# Patient Record
Sex: MALE | Race: WHITE | NOT HISPANIC OR LATINO | URBAN - METROPOLITAN AREA
[De-identification: names, ages, dates, MRNs, and addresses within clinical notes are randomized per-mention and may not be internally consistent; named-entity substitution may affect disease eponyms.]

---

## 2023-02-06 ENCOUNTER — APPOINTMENT (OUTPATIENT)
Dept: CT IMAGING | Facility: HOSPITAL | Age: 83
End: 2023-02-06
Payer: MEDICARE

## 2023-02-06 ENCOUNTER — HOSPITAL ENCOUNTER (EMERGENCY)
Facility: HOSPITAL | Age: 83
Discharge: HOME | End: 2023-02-06
Attending: EMERGENCY MEDICINE | Admitting: EMERGENCY MEDICINE
Payer: MEDICARE

## 2023-02-06 VITALS
SYSTOLIC BLOOD PRESSURE: 160 MMHG | HEART RATE: 63 BPM | OXYGEN SATURATION: 97 % | TEMPERATURE: 97.7 F | WEIGHT: 243.39 LBS | HEIGHT: 71 IN | DIASTOLIC BLOOD PRESSURE: 76 MMHG | RESPIRATION RATE: 18 BRPM | BODY MASS INDEX: 34.07 KG/M2

## 2023-02-06 DIAGNOSIS — N23 RENAL COLIC: Primary | ICD-10-CM

## 2023-02-06 DIAGNOSIS — N20.1 URETEROLITHIASIS: ICD-10-CM

## 2023-02-06 LAB
ALBUMIN SERPL-MCNC: 3.7 G/DL (ref 3.5–5.2)
ALBUMIN/GLOB SERPL: 1.3 G/DL
ALP SERPL-CCNC: 87 U/L (ref 39–117)
ALT SERPL W P-5'-P-CCNC: 19 U/L (ref 1–41)
ANION GAP SERPL CALCULATED.3IONS-SCNC: 12 MMOL/L (ref 5–15)
AST SERPL-CCNC: 23 U/L (ref 1–40)
BACTERIA UR QL AUTO: ABNORMAL /HPF
BASOPHILS # BLD AUTO: 0.03 10*3/MM3 (ref 0–0.2)
BASOPHILS NFR BLD AUTO: 0.4 % (ref 0–1.5)
BILIRUB SERPL-MCNC: 0.6 MG/DL (ref 0–1.2)
BILIRUB UR QL STRIP: NEGATIVE
BUN SERPL-MCNC: 16 MG/DL (ref 8–23)
BUN/CREAT SERPL: 15.7 (ref 7–25)
CALCIUM SPEC-SCNC: 8.9 MG/DL (ref 8.6–10.5)
CHLORIDE SERPL-SCNC: 103 MMOL/L (ref 98–107)
CLARITY UR: CLEAR
CO2 SERPL-SCNC: 22 MMOL/L (ref 22–29)
COLOR UR: YELLOW
CREAT SERPL-MCNC: 1.02 MG/DL (ref 0.76–1.27)
D-LACTATE SERPL-SCNC: 1.1 MMOL/L (ref 0.5–2)
D-LACTATE SERPL-SCNC: 3.8 MMOL/L (ref 0.5–2)
DEPRECATED RDW RBC AUTO: 46.4 FL (ref 37–54)
EGFRCR SERPLBLD CKD-EPI 2021: 73.4 ML/MIN/1.73
EOSINOPHIL # BLD AUTO: 0.26 10*3/MM3 (ref 0–0.4)
EOSINOPHIL NFR BLD AUTO: 3.5 % (ref 0.3–6.2)
ERYTHROCYTE [DISTWIDTH] IN BLOOD BY AUTOMATED COUNT: 13.1 % (ref 12.3–15.4)
GLOBULIN UR ELPH-MCNC: 2.9 GM/DL
GLUCOSE SERPL-MCNC: 208 MG/DL (ref 65–99)
GLUCOSE UR STRIP-MCNC: ABNORMAL MG/DL
HCT VFR BLD AUTO: 38.9 % (ref 37.5–51)
HGB BLD-MCNC: 13.1 G/DL (ref 13–17.7)
HGB UR QL STRIP.AUTO: NEGATIVE
HOLD SPECIMEN: NORMAL
HOLD SPECIMEN: NORMAL
HYALINE CASTS UR QL AUTO: ABNORMAL /LPF
IMM GRANULOCYTES # BLD AUTO: 0.03 10*3/MM3 (ref 0–0.05)
IMM GRANULOCYTES NFR BLD AUTO: 0.4 % (ref 0–0.5)
KETONES UR QL STRIP: NEGATIVE
LEUKOCYTE ESTERASE UR QL STRIP.AUTO: ABNORMAL
LIPASE SERPL-CCNC: 23 U/L (ref 13–60)
LYMPHOCYTES # BLD AUTO: 2 10*3/MM3 (ref 0.7–3.1)
LYMPHOCYTES NFR BLD AUTO: 26.8 % (ref 19.6–45.3)
MCH RBC QN AUTO: 32.3 PG (ref 26.6–33)
MCHC RBC AUTO-ENTMCNC: 33.7 G/DL (ref 31.5–35.7)
MCV RBC AUTO: 96 FL (ref 79–97)
MONOCYTES # BLD AUTO: 0.58 10*3/MM3 (ref 0.1–0.9)
MONOCYTES NFR BLD AUTO: 7.8 % (ref 5–12)
NEUTROPHILS NFR BLD AUTO: 4.55 10*3/MM3 (ref 1.7–7)
NEUTROPHILS NFR BLD AUTO: 61.1 % (ref 42.7–76)
NITRITE UR QL STRIP: NEGATIVE
NRBC BLD AUTO-RTO: 0 /100 WBC (ref 0–0.2)
PH UR STRIP.AUTO: <=5 [PH] (ref 5–8)
PLATELET # BLD AUTO: 190 10*3/MM3 (ref 140–450)
PMV BLD AUTO: 10.8 FL (ref 6–12)
POTASSIUM SERPL-SCNC: 4 MMOL/L (ref 3.5–5.2)
PROT SERPL-MCNC: 6.6 G/DL (ref 6–8.5)
PROT UR QL STRIP: NEGATIVE
RBC # BLD AUTO: 4.05 10*6/MM3 (ref 4.14–5.8)
RBC # UR STRIP: ABNORMAL /HPF
REF LAB TEST METHOD: ABNORMAL
SODIUM SERPL-SCNC: 137 MMOL/L (ref 136–145)
SP GR UR STRIP: 1.02 (ref 1–1.03)
SQUAMOUS #/AREA URNS HPF: ABNORMAL /HPF
UROBILINOGEN UR QL STRIP: ABNORMAL
WBC # UR STRIP: ABNORMAL /HPF
WBC NRBC COR # BLD: 7.45 10*3/MM3 (ref 3.4–10.8)
WHOLE BLOOD HOLD COAG: NORMAL
WHOLE BLOOD HOLD SPECIMEN: NORMAL

## 2023-02-06 PROCEDURE — 83605 ASSAY OF LACTIC ACID: CPT

## 2023-02-06 PROCEDURE — 74176 CT ABD & PELVIS W/O CONTRAST: CPT

## 2023-02-06 PROCEDURE — 83690 ASSAY OF LIPASE: CPT

## 2023-02-06 PROCEDURE — 85025 COMPLETE CBC W/AUTO DIFF WBC: CPT

## 2023-02-06 PROCEDURE — 36415 COLL VENOUS BLD VENIPUNCTURE: CPT

## 2023-02-06 PROCEDURE — 80053 COMPREHEN METABOLIC PANEL: CPT

## 2023-02-06 PROCEDURE — 81001 URINALYSIS AUTO W/SCOPE: CPT

## 2023-02-06 PROCEDURE — 87086 URINE CULTURE/COLONY COUNT: CPT

## 2023-02-06 PROCEDURE — 99283 EMERGENCY DEPT VISIT LOW MDM: CPT

## 2023-02-06 PROCEDURE — 83605 ASSAY OF LACTIC ACID: CPT | Performed by: EMERGENCY MEDICINE

## 2023-02-06 PROCEDURE — 87077 CULTURE AEROBIC IDENTIFY: CPT

## 2023-02-06 PROCEDURE — 87186 SC STD MICRODIL/AGAR DIL: CPT

## 2023-02-06 RX ORDER — SODIUM CHLORIDE 0.9 % (FLUSH) 0.9 %
10 SYRINGE (ML) INJECTION AS NEEDED
Status: DISCONTINUED | OUTPATIENT
Start: 2023-02-06 | End: 2023-02-06 | Stop reason: HOSPADM

## 2023-02-06 RX ADMIN — SODIUM CHLORIDE, POTASSIUM CHLORIDE, SODIUM LACTATE AND CALCIUM CHLORIDE 1000 ML: 600; 310; 30; 20 INJECTION, SOLUTION INTRAVENOUS at 17:56

## 2023-02-06 NOTE — ED PROVIDER NOTES
Time: 5:51 PM EST  Date of encounter:  2/6/2023  Independent Historian/Clinical History and Information was obtained by:   Patient and Family     Chief Complaint: Right-sided abdominal pain    History is limited by: N/A    History of Present Illness:  Patient is a 82 y.o. year old male who presents to the emergency department for evaluation of right-sided abdominal pain.  Patient complains of sharp pain that began last night.  Patient is a pain kept him up most of the night.  He states that the pain radiated from his right flank to his groin.  Patient reports his pain has markedly improved over the last hour or so.  Patient midst to some nausea but denies any vomiting.  He denies any fevers or chills.  Patient denies ever having pain like this before.    HPI    Patient Care Team  Primary Care Provider: Provider, Kalyn Known    Past Medical History:     Allergies   Allergen Reactions   • Penicillins Unknown - Low Severity     Past Medical History:   Diagnosis Date   • Hyperlipidemia      Past Surgical History:   Procedure Laterality Date   • APPENDECTOMY     • COLON SURGERY     • EYE SURGERY       History reviewed. No pertinent family history.    Home Medications:  Prior to Admission medications    Medication Sig Start Date End Date Taking? Authorizing Provider   dutasteride (AVODART) 0.5 MG capsule Take 0.5 mg by mouth Daily.    ProviderSalud MD   simvastatin (ZOCOR) 10 MG tablet Take 10 mg by mouth.    ProviderSalud MD   tamsulosin (FLOMAX) 0.4 MG capsule 24 hr capsule Take 0.4 mg by mouth Daily.    Provider, MD Salud        Social History:   Social History     Tobacco Use   • Smoking status: Never   • Smokeless tobacco: Never   Vaping Use   • Vaping Use: Never used   Substance Use Topics   • Alcohol use: Never   • Drug use: Never         Review of Systems:  Review of Systems   Constitutional: Negative for chills and fever.   HENT: Negative for congestion, ear pain and sore throat.    Eyes:  "Negative for pain.   Respiratory: Negative for cough, chest tightness and shortness of breath.    Cardiovascular: Negative for chest pain.   Gastrointestinal: Positive for abdominal pain and nausea. Negative for diarrhea and vomiting.   Genitourinary: Positive for flank pain. Negative for hematuria.   Musculoskeletal: Negative for joint swelling.   Skin: Negative for pallor.   Neurological: Negative for seizures and headaches.   All other systems reviewed and are negative.       Physical Exam:  /76   Pulse 63   Temp 97.7 °F (36.5 °C)   Resp 18   Ht 180.3 cm (71\")   Wt 110 kg (243 lb 6.2 oz)   SpO2 97%   BMI 33.95 kg/m²     Physical Exam       Vital signs were reviewed under triage note.  General appearance - Patient appears well-developed and well-nourished.  Patient is in no acute distress.  Head - Normocephalic, atraumatic.  Pupils - Equal, round, reactive to light.  Extraocular muscles are intact.  Conjunctive is clear.  Nasal - Normal inspection.  No evidence of trauma or epistaxis.  Tympanic membranes - Gray, intact without erythema or retractions.  Oral mucosa - Pink and moist without lesions or erythema.  Uvula is midline.  Chest wall - Atraumatic.  Chest wall is nontender.  There is no vesicular rashes noted.  Neck - Supple.  Trachea was midline.  There is no palpable lymphadenopathy or thyromegaly.  There are no meningeal signs  Lungs - Clear to auscultation and percussion bilaterally.  Heart - Regular rate and rhythm without any murmurs, clicks, or gallops.  Abdomen - Soft.  Bowel sounds are present.  There is no palpable tenderness.  There is no rebound, guarding, or rigidity.  There are no palpable masses.  There are no pulsatile masses.  Back - Spine is straight and midline.  There is no CVA tenderness.  Extremities - Intact x4 with full range of motion.  There is no palpable edema.  Pulses are intact x4 and equal.  Neurologic - Patient is awake, alert, and oriented x3.  Cranial nerves II " through XII are grossly intact.  Motor and sensory functions grossly intact.  Cerebellar function was normal.  Integument - There are no rashes.  There are no petechia or purpura lesions noted.  There are no vesicular lesions noted.      Procedures:  Procedures      Medical Decision Making:      Comorbidities that affect care:    Hyperlipidemia, BPH    External Notes reviewed:    None      The following orders were placed and all results were independently analyzed by me:  Orders Placed This Encounter   Procedures   • CT Abdomen Pelvis Stone Protocol   • [place] Draw   • Comprehensive Metabolic Panel   • Lipase   • Urinalysis With Microscopic If Indicated (No Culture) - Urine, Clean Catch   • Lactic Acid, Plasma   • CBC Auto Differential   • STAT Lactic Acid, Reflex   • Urinalysis, Microscopic Only - Urine, Clean Catch   • Undress & Gown   • CBC & Differential   • Green Top (Gel)   • Lavender Top   • Gold Top - SST   • Light Blue Top       Medications Given in the Emergency Department:  Medications   lactated ringers bolus 1,000 mL (0 mL Intravenous Stopped 2/6/23 5574)        ED Course:     The patient was seen and evaluated in the ED by me.  The above history and physical examination was performed as documented.  Diagnostic data was obtained.  Results reviewed.  Discussed with the patient.  The patient's pain has resolved.  Patient CT work-up demonstrates the patient is actually passed the kidney stone into the bladder.  This would account for his resolution of pain.  At this point time patient be discharged home.  There is no signs of infection.    Labs:    Lab Results (last 24 hours)     Procedure Component Value Units Date/Time    POC Urinalysis Dipstick, Multipro (Automated Dipstick) [[id number]]  (Abnormal) Resulted: 02/06/23 1244    Specimen: Urine Updated: 02/06/23 1245     Color Elsy     Clarity, UA Clear     Glucose, UA Negative mg/dL      Bilirubin Negative     Ketones, UA Negative     Specific Gravity   1.030     Blood, UA Negative     pH, Urine 5.0     Protein, POC Negative mg/dL      Urobilinogen, UA 0.2 E.U./dL     Nitrite, UA --     Leukocytes Small (1+)    Urine Culture - Urine, Urine, Clean Catch [[id number]]  (Normal) Collected: 02/06/23 1307    Specimen: Urine, Clean Catch Updated: 02/07/23 1050     Urine Culture Growth present, too young to evaluate    CBC & Differential [[id number]]  (Abnormal) Collected: 02/06/23 1512    Specimen: Blood Updated: 02/06/23 1525    Narrative:      The following orders were created for panel order CBC & Differential.  Procedure                               Abnormality         Status                     ---------                               -----------         ------                     CBC Auto Differential[[id number]]        Abnormal            Final result                 Please view results for these tests on the individual orders.    Comprehensive Metabolic Panel [[id number]]  (Abnormal) Collected: 02/06/23 1512    Specimen: Blood Updated: 02/06/23 1544     Glucose 208 mg/dL      BUN 16 mg/dL      Creatinine 1.02 mg/dL      Sodium 137 mmol/L      Potassium 4.0 mmol/L      Chloride 103 mmol/L      CO2 22.0 mmol/L      Calcium 8.9 mg/dL      Total Protein 6.6 g/dL      Albumin 3.7 g/dL      ALT (SGPT) 19 U/L      AST (SGOT) 23 U/L      Alkaline Phosphatase 87 U/L      Total Bilirubin 0.6 mg/dL      Globulin 2.9 gm/dL      A/G Ratio 1.3 g/dL      BUN/Creatinine Ratio 15.7     Anion Gap 12.0 mmol/L      eGFR 73.4 mL/min/1.73     Narrative:      GFR Normal >60  Chronic Kidney Disease <60  Kidney Failure <15    The GFR formula is only valid for adults with stable renal function between ages 18 and 70.    Lipase [[id number]]  (Normal) Collected: 02/06/23 1512    Specimen: Blood Updated: 02/06/23 1544     Lipase 23 U/L     Lactic Acid, Plasma [[id number]]  (Abnormal) Collected: 02/06/23 1512    Specimen: Blood Updated: 02/06/23 1555     Lactate 3.8 mmol/L     CBC Auto Differential  [[id number]]  (Abnormal) Collected: 02/06/23 1512    Specimen: Blood Updated: 02/06/23 1525     WBC 7.45 10*3/mm3      RBC 4.05 10*6/mm3      Hemoglobin 13.1 g/dL      Hematocrit 38.9 %      MCV 96.0 fL      MCH 32.3 pg      MCHC 33.7 g/dL      RDW 13.1 %      RDW-SD 46.4 fl      MPV 10.8 fL      Platelets 190 10*3/mm3      Neutrophil % 61.1 %      Lymphocyte % 26.8 %      Monocyte % 7.8 %      Eosinophil % 3.5 %      Basophil % 0.4 %      Immature Grans % 0.4 %      Neutrophils, Absolute 4.55 10*3/mm3      Lymphocytes, Absolute 2.00 10*3/mm3      Monocytes, Absolute 0.58 10*3/mm3      Eosinophils, Absolute 0.26 10*3/mm3      Basophils, Absolute 0.03 10*3/mm3      Immature Grans, Absolute 0.03 10*3/mm3      nRBC 0.0 /100 WBC     Urinalysis With Microscopic If Indicated (No Culture) - Urine, Clean Catch [[id number]]  (Abnormal) Collected: 02/06/23 1611    Specimen: Urine, Clean Catch Updated: 02/06/23 1631     Color, UA Yellow     Appearance, UA Clear     pH, UA <=5.0     Specific Gravity, UA 1.024     Glucose,  mg/dL (1+)     Ketones, UA Negative     Bilirubin, UA Negative     Blood, UA Negative     Protein, UA Negative     Leuk Esterase, UA Small (1+)     Nitrite, UA Negative     Urobilinogen, UA 1.0 E.U./dL    Urinalysis, Microscopic Only - Urine, Clean Catch [[id number]]  (Abnormal) Collected: 02/06/23 1611    Specimen: Urine, Clean Catch Updated: 02/06/23 1631     RBC, UA 0-2 /HPF      WBC, UA 3-5 /HPF      Bacteria, UA None Seen /HPF      Squamous Epithelial Cells, UA 0-2 /HPF      Hyaline Casts, UA 3-6 /LPF      Methodology Automated Microscopy    STAT Lactic Acid, Reflex [[id number]]  (Normal) Collected: 02/06/23 1822    Specimen: Blood Updated: 02/06/23 1831     Lactate 1.1 mmol/L            Imaging:    CT Abdomen Pelvis Stone Protocol    Result Date: 2/6/2023  PROCEDURE: CT ABDOMEN PELVIS STONE PROTOCOL  COMPARISON: None  INDICATIONS: abd pain, UC sent for stone r/o  TECHNIQUE: CT images were created  without intravenous contrast.   PROTOCOL:   Standard imaging protocol performed    RADIATION:   DLP: 831.2 mGy*cm   Automated exposure control was utilized to minimize radiation dose.  FINDINGS:  Limited views of the lung bases demonstrate mild linear opacities suggestive of atelectasis.  The liver is unremarkable.  The gallbladder is normal.  The pancreas is normal.  Spleen is unremarkable.  Bilateral adrenal glands are normal.  There is a stone 5 mm stone adjacent to the right UVJ.  There is no right-sided hydronephrosis.  This may reflect a recently passed stone.  There is thinning of the renal cortices bilaterally.  There is an exophytic right renal cyst.  Prostatic calcifications are identified.  There is diverticulosis without evidence of diverticulitis.  The appendix is not well visualized.  The small bowel is unremarkable.  There is a small fat containing umbilical hernia.  There is diastasis of the rectus abdominus muscle.  No aggressive appearing lytic or sclerotic bone lesions are identified.        1. 5 mm stone layering dependently within the bladder, adjacent to the right UVJ, may represent a recently passed stone.  No evidence of hydronephrosis or obstructive uropathy is identified.     MARIO MARTINEZ MD       Electronically Signed and Approved By: MARIO MARTINEZ MD on 2/06/2023 at 15:51             XR Abdomen KUB    Result Date: 2/6/2023  PROCEDURE: XR ABDOMEN KUB  COMPARISON: None  INDICATIONS: LOWER ABDOMINAL PAIN  FINDINGS:  There is a calcification in the right upper quadrant measuring 0.62 cm.  This could be renal in nature.  This might be in the area of the renal pelvis.  It looks like there may be left renal calculus measuring 1.1 cm as well as tiny calcifications in the area of the left kidney.  Assessment is somewhat limited by overlying bowel.  There are pelvic calcifications which could reflect phleboliths.  There are degenerative changes involving lumbar spine more marked in the lower  lumbar area.  Bowel gas pattern is nonspecific.         1. Bilateral renal calculi are suggested.  CT may be of benefit to reassess.     AUDREY GUILLORY MD       Electronically Signed and Approved By: AUDREY GUILLORY MD on 2/06/2023 at 12:58                 Differential Diagnosis and Discussion:    Abdominal Pain: Based on the patient's signs and symptoms, I considered abdominal aortic aneurysm, small bowel obstruction, pancreatitis, acute cholecystitis, acute appendecitis, peptic ulcer disease, gastritis, colitis, endocrine disorders, irritable bowel syndrome and other differential diagnosis an etiology of the patient's abdominal pain.    All labs were reviewed and analyzed by me.  CT scan radiology interpretation was reviewed by me.    MDM         Patient Care Considerations:          Consultants/Shared Management Plan:    None    Social Determinants of Health:    Patient has presented with family members who are responsible, reliable and will ensure follow up care.      Disposition and Care Coordination:    Discharged: The patient is suitable and stable for discharge with no need for consideration of observation or admission.    I have explained the patient´s condition, diagnoses and treatment plan based on the information available to me at this time. I have answered questions and addressed any concerns. The patient has a good  understanding of the patient´s diagnosis, condition, and treatment plan as can be expected at this point. The vital signs have been stable. The patient´s condition is stable and appropriate for discharge from the emergency department.      The patient will pursue further outpatient evaluation with the primary care physician or other designated or consulting physician as outlined in the discharge instructions. They are agreeable to this plan of care and follow-up instructions have been explained in detail. The patient has received these instructions in written format and have expressed an  understanding of the discharge instructions. The patient is aware that any significant change in condition or worsening of symptoms should prompt an immediate return to this or the closest emergency department or call to 911.  I have explained discharge medications and the need for follow up with the patient/caretakers. This was also printed in the discharge instructions. Patient was discharged with the following medications and follow up:      Medication List      No changes were made to your prescriptions during this visit.        With your family doctor as needed.           Final diagnoses:   Renal colic   Ureterolithiasis        ED Disposition     ED Disposition   Discharge    Condition   Stable    Comment   --             This medical record created using voice recognition software.             Gio Elaine DO  02/07/23 1159

## 2023-02-06 NOTE — ED PROVIDER NOTES
Time: 3:01 PM EST  Date of encounter:  2/6/2023  Independent Historian/Clinical History and Information was obtained by:   Patient  Chief Complaint   Patient presents with   • Abdominal Pain       History is limited by: N/A    History of Present Illness:  Patient is a 82 y.o. year old male who presents to the emergency department for evaluation of RLQ abdominal pain. Starting yesterday. Seen by  and concerned for kidney stone. KUB showed b/l renal calculi and US showed small leuks. Denies known history of stones. Denies known hematuria, dysuria, nausea, vomiting, diarrhea. Denies flank pain. (Abbie Salinas PA-C provider in triage 3:02 PM EST )     [facility]    Patient Care Team  Primary Care Provider: Kalyn Hathaway Known    Past Medical History:     Allergies   Allergen Reactions   • Penicillins Unknown - Low Severity     Past Medical History:   Diagnosis Date   • Hyperlipidemia      Past Surgical History:   Procedure Laterality Date   • APPENDECTOMY     • COLON SURGERY     • EYE SURGERY       No family history on file.    Home Medications:  Prior to Admission medications    Medication Sig Start Date End Date Taking? Authorizing Provider   dutasteride (AVODART) 0.5 MG capsule Take 0.5 mg by mouth Daily.    ProviderSalud MD   simvastatin (ZOCOR) 10 MG tablet Take 10 mg by mouth.    ProviderSalud MD   tamsulosin (FLOMAX) 0.4 MG capsule 24 hr capsule Take 0.4 mg by mouth Daily.    ProviderSalud MD        Social History:   Social History     Tobacco Use   • Smoking status: Never   • Smokeless tobacco: Never   Vaping Use   • Vaping Use: Never used   Substance Use Topics   • Alcohol use: Never   • Drug use: Never         Review of Systems:  Review of Systems   Gastrointestinal: Positive for abdominal pain. Negative for diarrhea, nausea and vomiting.   Genitourinary: Negative for dysuria.        Physical Exam:  /63 (BP Location: Left arm)   Pulse 66   Temp 97.7 °F (36.5 °C)   Resp 18   Ht  "180.3 cm (71\")   Wt 110 kg (243 lb 6.2 oz)   SpO2 95%   BMI 33.95 kg/m²     Physical Exam  Constitutional:       Appearance: Normal appearance.   HENT:      Head: Normocephalic.   Eyes:      Extraocular Movements: Extraocular movements intact.      Conjunctiva/sclera: Conjunctivae normal.   Pulmonary:      Effort: Pulmonary effort is normal.   Abdominal:      General: There is no distension.      Tenderness: There is abdominal tenderness.   Skin:     General: Skin is warm.      Coloration: Skin is not cyanotic.   Neurological:      Mental Status: He is alert and oriented to person, place, and time.   Psychiatric:         Attention and Perception: Attention and perception normal.         Mood and Affect: Mood normal.                  Procedures:  Procedures      Medical Decision Making:      Comorbidities that affect care:    {Comorbidities that affect care:24142}    External Notes reviewed:    {External Note review (Optional):06087}      The following orders were placed and all results were independently analyzed by me:  No orders of the defined types were placed in this encounter.      Medications Given in the Emergency Department:  Medications - No data to display     ED Course:    The patient was initially evaluated in the triage area where orders were placed. The patient was later dispositioned by Abbie Salinas PA-C.      The patient was advised to stay for completion of workup which includes but is not limited to communication of labs and radiological results, reassessment and plan. The patient was advised that leaving prior to disposition by a provider could result in critical findings that are not communicated to the patient.          Labs:    Lab Results (last 24 hours)     Procedure Component Value Units Date/Time    POC Urinalysis Dipstick, Multipro (Automated Dipstick) [[id number]]  (Abnormal) Resulted: 02/06/23 1244    Specimen: Urine Updated: 02/06/23 1245     Color Elsy     Clarity, UA Clear     " Glucose, UA Negative mg/dL      Bilirubin Negative     Ketones, UA Negative     Specific Gravity  1.030     Blood, UA Negative     pH, Urine 5.0     Protein, POC Negative mg/dL      Urobilinogen, UA 0.2 E.U./dL     Nitrite, UA --     Leukocytes Small (1+)    Urine Culture - Urine, Urine, Clean Catch [[id number]] Collected: 02/06/23 1307    Specimen: Urine, Clean Catch Updated: 02/06/23 1308           Imaging:    XR Abdomen KUB    Result Date: 2/6/2023  PROCEDURE: XR ABDOMEN KUB  COMPARISON: None  INDICATIONS: LOWER ABDOMINAL PAIN  FINDINGS:  There is a calcification in the right upper quadrant measuring 0.62 cm.  This could be renal in nature.  This might be in the area of the renal pelvis.  It looks like there may be left renal calculus measuring 1.1 cm as well as tiny calcifications in the area of the left kidney.  Assessment is somewhat limited by overlying bowel.  There are pelvic calcifications which could reflect phleboliths.  There are degenerative changes involving lumbar spine more marked in the lower lumbar area.  Bowel gas pattern is nonspecific.         1. Bilateral renal calculi are suggested.  CT may be of benefit to reassess.     AUDREY GUILLORY MD       Electronically Signed and Approved By: AUDREY GUILLORY MD on 2/06/2023 at 12:58                 Differential Diagnosis and Discussion:      {Differentials:93209}    {Independent Review of (Optional):06228}    MDM     {Critical Care:79254}    Patient Care Considerations:    {Considerations (Optional):53002}      Consultants/Shared Management Plan:    {Shared Management Plan (Optional):32327}    Social Determinants of Health:    {Social Determinants of Health (Optional):42135}      Disposition and Care Coordination:    {Admission consideration:92399}    {Discharge (Optional):72659}    Final diagnoses:   None        ED Disposition     None          This medical record created using voice recognition software.

## 2023-02-07 NOTE — DISCHARGE INSTRUCTIONS
Drink plenty fluids.  Continue current home medications.  Take Tylenol or Motrin as needed for any pain.  Follow with your family doctor as needed.  Return to the ER for any recurrent pain or any other concerns issues that may arise.

## 2023-04-22 PROCEDURE — 87086 URINE CULTURE/COLONY COUNT: CPT | Performed by: STUDENT IN AN ORGANIZED HEALTH CARE EDUCATION/TRAINING PROGRAM

## 2023-04-22 PROCEDURE — 87077 CULTURE AEROBIC IDENTIFY: CPT | Performed by: STUDENT IN AN ORGANIZED HEALTH CARE EDUCATION/TRAINING PROGRAM

## 2023-04-22 PROCEDURE — 87186 SC STD MICRODIL/AGAR DIL: CPT | Performed by: STUDENT IN AN ORGANIZED HEALTH CARE EDUCATION/TRAINING PROGRAM

## 2023-04-24 ENCOUNTER — TELEPHONE (OUTPATIENT)
Dept: URGENT CARE | Facility: CLINIC | Age: 83
End: 2023-04-24
Payer: MEDICARE

## 2023-04-24 DIAGNOSIS — R31.9 HEMATURIA, UNSPECIFIED TYPE: Primary | ICD-10-CM

## 2023-04-24 RX ORDER — SULFAMETHOXAZOLE AND TRIMETHOPRIM 800; 160 MG/1; MG/1
1 TABLET ORAL 2 TIMES DAILY
Qty: 14 TABLET | Refills: 0 | Status: SHIPPED | OUTPATIENT
Start: 2023-04-24 | End: 2023-05-01

## 2023-04-24 NOTE — TELEPHONE ENCOUNTER
Called patient to review results, patient's sister answered and states that the patient is currently napping.  She will have the patient call the office back after he wakes up.

## 2023-05-01 ENCOUNTER — LAB (OUTPATIENT)
Dept: LAB | Facility: HOSPITAL | Age: 83
End: 2023-05-01
Payer: MEDICARE

## 2023-05-01 ENCOUNTER — TRANSCRIBE ORDERS (OUTPATIENT)
Dept: ADMINISTRATIVE | Facility: HOSPITAL | Age: 83
End: 2023-05-01
Payer: MEDICARE

## 2023-05-01 DIAGNOSIS — R93.89 NONSPECIFIC ABNORMAL FINDINGS ON DIAGNOSTIC IMAGING: ICD-10-CM

## 2023-05-01 DIAGNOSIS — R93.89 NONSPECIFIC ABNORMAL FINDINGS ON DIAGNOSTIC IMAGING: Primary | ICD-10-CM

## 2023-05-01 LAB
ANION GAP SERPL CALCULATED.3IONS-SCNC: 7.7 MMOL/L (ref 5–15)
BUN SERPL-MCNC: 21 MG/DL (ref 8–23)
BUN/CREAT SERPL: 13.2 (ref 7–25)
CALCIUM SPEC-SCNC: 9.8 MG/DL (ref 8.6–10.5)
CHLORIDE SERPL-SCNC: 103 MMOL/L (ref 98–107)
CO2 SERPL-SCNC: 25.3 MMOL/L (ref 22–29)
CREAT SERPL-MCNC: 1.59 MG/DL (ref 0.76–1.27)
EGFRCR SERPLBLD CKD-EPI 2021: 42.8 ML/MIN/1.73
GLUCOSE SERPL-MCNC: 149 MG/DL (ref 65–99)
POTASSIUM SERPL-SCNC: 5 MMOL/L (ref 3.5–5.2)
SODIUM SERPL-SCNC: 136 MMOL/L (ref 136–145)

## 2023-05-01 PROCEDURE — 36415 COLL VENOUS BLD VENIPUNCTURE: CPT

## 2023-05-01 PROCEDURE — 80048 BASIC METABOLIC PNL TOTAL CA: CPT

## 2023-05-27 PROCEDURE — 87077 CULTURE AEROBIC IDENTIFY: CPT | Performed by: FAMILY MEDICINE

## 2023-05-27 PROCEDURE — 87086 URINE CULTURE/COLONY COUNT: CPT | Performed by: FAMILY MEDICINE

## 2023-05-27 PROCEDURE — 87186 SC STD MICRODIL/AGAR DIL: CPT | Performed by: FAMILY MEDICINE

## 2023-05-30 ENCOUNTER — TELEPHONE (OUTPATIENT)
Dept: URGENT CARE | Facility: CLINIC | Age: 83
End: 2023-05-30

## 2023-05-30 NOTE — TELEPHONE ENCOUNTER
----- Message from John Calvin Cooksey, PA-C sent at 5/30/2023  9:33 AM EDT -----  Please call the patient regarding his urine culture, which showed growth for Proteus mirabilis.  This is a bacteria that can cause urinary tract infections, and the culture showed it is susceptible, or can be treated by the antibiotic he is taking currently, Bactrim. He should continue taking the antibiotic as prescribed by his provider at his last visit, and follow-up with his primary care provider as instructed by his provider at his last visit if his symptoms are worsening, or persisting.     Thank you,     -John Cooksey, PA-C

## 2023-08-29 ENCOUNTER — HOSPITAL ENCOUNTER (EMERGENCY)
Facility: HOSPITAL | Age: 83
Discharge: HOME | End: 2023-08-29
Attending: EMERGENCY MEDICINE
Payer: MEDICARE

## 2023-08-29 VITALS
HEART RATE: 62 BPM | TEMPERATURE: 98.9 F | DIASTOLIC BLOOD PRESSURE: 68 MMHG | RESPIRATION RATE: 18 BRPM | SYSTOLIC BLOOD PRESSURE: 134 MMHG | WEIGHT: 226.41 LBS | OXYGEN SATURATION: 97 % | HEIGHT: 71 IN | BODY MASS INDEX: 31.7 KG/M2

## 2023-08-29 DIAGNOSIS — R30.0 DYSURIA: Primary | ICD-10-CM

## 2023-08-29 LAB
ALBUMIN SERPL-MCNC: 3.9 G/DL (ref 3.5–5.2)
ALBUMIN/GLOB SERPL: 1.3 G/DL
ALP SERPL-CCNC: 80 U/L (ref 39–117)
ALT SERPL W P-5'-P-CCNC: 10 U/L (ref 1–41)
ANION GAP SERPL CALCULATED.3IONS-SCNC: 10.8 MMOL/L (ref 5–15)
AST SERPL-CCNC: 22 U/L (ref 1–40)
BACTERIA UR QL AUTO: ABNORMAL /HPF
BASOPHILS # BLD AUTO: 0.04 10*3/MM3 (ref 0–0.2)
BASOPHILS NFR BLD AUTO: 0.6 % (ref 0–1.5)
BILIRUB SERPL-MCNC: 0.7 MG/DL (ref 0–1.2)
BILIRUB UR QL STRIP: NEGATIVE
BUN SERPL-MCNC: 21 MG/DL (ref 8–23)
BUN/CREAT SERPL: 19.8 (ref 7–25)
CALCIUM SPEC-SCNC: 9.3 MG/DL (ref 8.6–10.5)
CHLORIDE SERPL-SCNC: 103 MMOL/L (ref 98–107)
CLARITY UR: CLEAR
CO2 SERPL-SCNC: 23.2 MMOL/L (ref 22–29)
COLOR UR: YELLOW
CREAT SERPL-MCNC: 1.06 MG/DL (ref 0.76–1.27)
D-LACTATE SERPL-SCNC: 2.1 MMOL/L (ref 0.5–2)
DEPRECATED RDW RBC AUTO: 45.4 FL (ref 37–54)
EGFRCR SERPLBLD CKD-EPI 2021: 69.6 ML/MIN/1.73
EOSINOPHIL # BLD AUTO: 0.17 10*3/MM3 (ref 0–0.4)
EOSINOPHIL NFR BLD AUTO: 2.5 % (ref 0.3–6.2)
ERYTHROCYTE [DISTWIDTH] IN BLOOD BY AUTOMATED COUNT: 12.5 % (ref 12.3–15.4)
GLOBULIN UR ELPH-MCNC: 3 GM/DL
GLUCOSE SERPL-MCNC: 225 MG/DL (ref 65–99)
GLUCOSE UR STRIP-MCNC: NEGATIVE MG/DL
HCT VFR BLD AUTO: 42.1 % (ref 37.5–51)
HGB BLD-MCNC: 14.1 G/DL (ref 13–17.7)
HGB UR QL STRIP.AUTO: NEGATIVE
HOLD SPECIMEN: NORMAL
HOLD SPECIMEN: NORMAL
HYALINE CASTS UR QL AUTO: ABNORMAL /LPF
IMM GRANULOCYTES # BLD AUTO: 0.03 10*3/MM3 (ref 0–0.05)
IMM GRANULOCYTES NFR BLD AUTO: 0.4 % (ref 0–0.5)
KETONES UR QL STRIP: NEGATIVE
LEUKOCYTE ESTERASE UR QL STRIP.AUTO: ABNORMAL
LIPASE SERPL-CCNC: 19 U/L (ref 13–60)
LYMPHOCYTES # BLD AUTO: 2.02 10*3/MM3 (ref 0.7–3.1)
LYMPHOCYTES NFR BLD AUTO: 29.4 % (ref 19.6–45.3)
MCH RBC QN AUTO: 32.7 PG (ref 26.6–33)
MCHC RBC AUTO-ENTMCNC: 33.5 G/DL (ref 31.5–35.7)
MCV RBC AUTO: 97.7 FL (ref 79–97)
MONOCYTES # BLD AUTO: 0.54 10*3/MM3 (ref 0.1–0.9)
MONOCYTES NFR BLD AUTO: 7.9 % (ref 5–12)
NEUTROPHILS NFR BLD AUTO: 4.07 10*3/MM3 (ref 1.7–7)
NEUTROPHILS NFR BLD AUTO: 59.2 % (ref 42.7–76)
NITRITE UR QL STRIP: NEGATIVE
NRBC BLD AUTO-RTO: 0 /100 WBC (ref 0–0.2)
PH UR STRIP.AUTO: 5.5 [PH] (ref 5–8)
PLATELET # BLD AUTO: 200 10*3/MM3 (ref 140–450)
PMV BLD AUTO: 11.1 FL (ref 6–12)
POTASSIUM SERPL-SCNC: 4.1 MMOL/L (ref 3.5–5.2)
PROT SERPL-MCNC: 6.9 G/DL (ref 6–8.5)
PROT UR QL STRIP: NEGATIVE
RBC # BLD AUTO: 4.31 10*6/MM3 (ref 4.14–5.8)
RBC # UR STRIP: ABNORMAL /HPF
REF LAB TEST METHOD: ABNORMAL
SODIUM SERPL-SCNC: 137 MMOL/L (ref 136–145)
SP GR UR STRIP: 1.02 (ref 1–1.03)
SQUAMOUS #/AREA URNS HPF: ABNORMAL /HPF
UROBILINOGEN UR QL STRIP: ABNORMAL
WBC # UR STRIP: ABNORMAL /HPF
WBC NRBC COR # BLD: 6.87 10*3/MM3 (ref 3.4–10.8)
WHOLE BLOOD HOLD COAG: NORMAL
WHOLE BLOOD HOLD SPECIMEN: NORMAL

## 2023-08-29 PROCEDURE — 83690 ASSAY OF LIPASE: CPT

## 2023-08-29 PROCEDURE — 99283 EMERGENCY DEPT VISIT LOW MDM: CPT

## 2023-08-29 PROCEDURE — 81001 URINALYSIS AUTO W/SCOPE: CPT

## 2023-08-29 PROCEDURE — 80053 COMPREHEN METABOLIC PANEL: CPT

## 2023-08-29 PROCEDURE — 36415 COLL VENOUS BLD VENIPUNCTURE: CPT

## 2023-08-29 PROCEDURE — 85025 COMPLETE CBC W/AUTO DIFF WBC: CPT

## 2023-08-29 PROCEDURE — 83605 ASSAY OF LACTIC ACID: CPT

## 2023-08-29 RX ORDER — SODIUM CHLORIDE 0.9 % (FLUSH) 0.9 %
10 SYRINGE (ML) INJECTION AS NEEDED
Status: DISCONTINUED | OUTPATIENT
Start: 2023-08-29 | End: 2023-08-29 | Stop reason: HOSPADM

## 2023-08-29 RX ADMIN — SODIUM CHLORIDE 1000 ML: 9 INJECTION, SOLUTION INTRAVENOUS at 17:14

## 2023-08-29 NOTE — ED PROVIDER NOTES
Time: 2:33 PM EDT  Date of encounter:  8/29/2023  Independent Historian/Clinical History and Information was obtained by:   Patient and Family    History is limited by: N/A    Chief Complaint   Patient presents with    Urinary Tract Infection         History of Present Illness:  Patient is a 83 y.o. year old male who presents to the emergency department for evaluation of dysuria and hematuria.  Patient is a poor historian and history primarily obtained from his wife.  Patient's wife reports his caregiver noticed blood in the front of his brief while changing his brief.  The patient reports burning with urination. Patient denies diarrhea, constipation, or flank pain.  Wife denies fevers. (JAIME Renee, provider in triage)    Patient's wife states he had a kidney stone once before.  Patient denies abdominal pain or flank pain at this time.  Patient states he thinks he is already passed the stone and does not want further imaging.      HPI    Patient Care Team  Primary Care Provider: Provider, No Known    Past Medical History:     Allergies   Allergen Reactions    Penicillins Unknown - Low Severity     Past Medical History:   Diagnosis Date    Hyperlipidemia      Past Surgical History:   Procedure Laterality Date    APPENDECTOMY      COLON SURGERY      EYE SURGERY       History reviewed. No pertinent family history.    Home Medications:  Prior to Admission medications    Medication Sig Start Date End Date Taking? Authorizing Provider   dutasteride (AVODART) 0.5 MG capsule Take 1 capsule by mouth Daily.    Salud Hathaway MD   mirtazapine (REMERON) 15 MG tablet take 0.5  tablet by oral route  every day before bedtime 5/15/23   Salud Hathaway MD   simvastatin (ZOCOR) 10 MG tablet Take 1 tablet by mouth.    Salud Hathaway MD   tamsulosin (FLOMAX) 0.4 MG capsule 24 hr capsule Take 1 capsule by mouth Daily.    Salud Hathaway MD        Social History:   Social History     Tobacco Use     "Smoking status: Never     Passive exposure: Never    Smokeless tobacco: Never   Vaping Use    Vaping Use: Never used   Substance Use Topics    Alcohol use: Never    Drug use: Never         Review of Systems:  Review of Systems   Constitutional:  Negative for fever.   Gastrointestinal:  Negative for abdominal pain, constipation and diarrhea.   Genitourinary:  Positive for difficulty urinating, dysuria and hematuria. Negative for flank pain.      Physical Exam:  /68   Pulse 62   Temp 98.9 °F (37.2 °C) (Oral)   Resp 18   Ht 180.3 cm (71\")   Wt 103 kg (226 lb 6.6 oz)   SpO2 97%   BMI 31.58 kg/m²         Physical Exam  Constitutional:       Appearance: Normal appearance.   HENT:      Head: Normocephalic.   Eyes:      Extraocular Movements: Extraocular movements intact.      Conjunctiva/sclera: Conjunctivae normal.   Pulmonary:      Effort: Pulmonary effort is normal.   Abdominal:      General: There is no distension.   Skin:     General: Skin is warm.      Coloration: Skin is not cyanotic.   Neurological:      Mental Status: He is alert and oriented to person, place, and time.   Psychiatric:         Attention and Perception: Attention and perception normal.         Mood and Affect: Mood normal.                    Procedures:  Procedures      Medical Decision Making:      Comorbidities that affect care:    Hyperlipidemia    External Notes reviewed:    Previous Clinic Note: 5/27/2023 for UTI      The following orders were placed and all results were independently analyzed by me:  Orders Placed This Encounter   Procedures    [place] Draw    Comprehensive Metabolic Panel    Lipase    Urinalysis With Microscopic If Indicated (No Culture) - Urine, Clean Catch    Lactic Acid, Plasma    CBC Auto Differential    Urinalysis, Microscopic Only - Urine, Clean Catch    STAT Lactic Acid, Reflex    NPO Diet NPO Type: Strict NPO    Undress & Gown    Insert Peripheral IV    CBC & Differential    Green Top (Gel)    Lavender " Top    Gold Top - SST    Light Blue Top       Medications Given in the Emergency Department:  Medications   sodium chloride 0.9 % flush 10 mL (has no administration in time range)   sodium chloride 0.9 % bolus 1,000 mL (1,000 mL Intravenous New Bag 8/29/23 9684)        ED Course:    The patient was initially evaluated in the triage area where orders were placed. The patient was later dispositioned by Kristopher Peterson PA-C.      The patient was advised to stay for completion of workup which includes but is not limited to communication of labs and radiological results, reassessment and plan. The patient was advised that leaving prior to disposition by a provider could result in critical findings that are not communicated to the patient.          Labs:    Lab Results (last 24 hours)       Procedure Component Value Units Date/Time    CBC & Differential [[id number]]  (Abnormal) Collected: 08/29/23 1438    Specimen: Blood Updated: 08/29/23 1449    Narrative:      The following orders were created for panel order CBC & Differential.  Procedure                               Abnormality         Status                     ---------                               -----------         ------                     CBC Auto Differential[[id number]]        Abnormal            Final result                 Please view results for these tests on the individual orders.    Comprehensive Metabolic Panel [[id number]]  (Abnormal) Collected: 08/29/23 1438    Specimen: Blood Updated: 08/29/23 1510     Glucose 225 mg/dL      BUN 21 mg/dL      Creatinine 1.06 mg/dL      Sodium 137 mmol/L      Potassium 4.1 mmol/L      Chloride 103 mmol/L      CO2 23.2 mmol/L      Calcium 9.3 mg/dL      Total Protein 6.9 g/dL      Albumin 3.9 g/dL      ALT (SGPT) 10 U/L      AST (SGOT) 22 U/L      Alkaline Phosphatase 80 U/L      Total Bilirubin 0.7 mg/dL      Globulin 3.0 gm/dL      A/G Ratio 1.3 g/dL      BUN/Creatinine Ratio 19.8     Anion Gap 10.8 mmol/L      eGFR  69.6 mL/min/1.73     Narrative:      GFR Normal >60  Chronic Kidney Disease <60  Kidney Failure <15    The GFR formula is only valid for adults with stable renal function between ages 18 and 70.    Lipase [[id number]]  (Normal) Collected: 08/29/23 1438    Specimen: Blood Updated: 08/29/23 1510     Lipase 19 U/L     Lactic Acid, Plasma [[id number]]  (Abnormal) Collected: 08/29/23 1438    Specimen: Blood Updated: 08/29/23 1516     Lactate 2.1 mmol/L     CBC Auto Differential [[id number]]  (Abnormal) Collected: 08/29/23 1438    Specimen: Blood Updated: 08/29/23 1449     WBC 6.87 10*3/mm3      RBC 4.31 10*6/mm3      Hemoglobin 14.1 g/dL      Hematocrit 42.1 %      MCV 97.7 fL      MCH 32.7 pg      MCHC 33.5 g/dL      RDW 12.5 %      RDW-SD 45.4 fl      MPV 11.1 fL      Platelets 200 10*3/mm3      Neutrophil % 59.2 %      Lymphocyte % 29.4 %      Monocyte % 7.9 %      Eosinophil % 2.5 %      Basophil % 0.6 %      Immature Grans % 0.4 %      Neutrophils, Absolute 4.07 10*3/mm3      Lymphocytes, Absolute 2.02 10*3/mm3      Monocytes, Absolute 0.54 10*3/mm3      Eosinophils, Absolute 0.17 10*3/mm3      Basophils, Absolute 0.04 10*3/mm3      Immature Grans, Absolute 0.03 10*3/mm3      nRBC 0.0 /100 WBC     Urinalysis With Microscopic If Indicated (No Culture) - Urine, Clean Catch [[id number]]  (Abnormal) Collected: 08/29/23 1500    Specimen: Urine, Clean Catch Updated: 08/29/23 1516     Color, UA Yellow     Appearance, UA Clear     pH, UA 5.5     Specific Gravity, UA 1.020     Glucose, UA Negative     Ketones, UA Negative     Bilirubin, UA Negative     Blood, UA Negative     Protein, UA Negative     Leuk Esterase, UA Trace     Nitrite, UA Negative     Urobilinogen, UA 0.2 E.U./dL    Urinalysis, Microscopic Only - Urine, Clean Catch [[id number]]  (Abnormal) Collected: 08/29/23 1500    Specimen: Urine, Clean Catch Updated: 08/29/23 1516     RBC, UA 0-2 /HPF      WBC, UA 0-2 /HPF      Bacteria, UA None Seen /HPF      Squamous  Epithelial Cells, UA 3-6 /HPF      Hyaline Casts, UA 0-2 /LPF      Methodology Automated Microscopy             Imaging:    No Radiology Exams Resulted Within Past 24 Hours      Differential Diagnosis and Discussion:      Dysuria: Differential diagnosis includes but is not limited to urethritis, cystitis, pyelonephritis, ureteral calculi, neoplasm, chemical irritant, urethral stricture, and trauma    All labs were reviewed and interpreted by me.    MDM     Amount and/or Complexity of Data Reviewed  Clinical lab tests: reviewed  Decide to obtain previous medical records or to obtain history from someone other than the patient: yes       I offered the patient and family a CT scan to evaluate for kidney stone.  Patient's family denied as they stated they have an appointment with urologist coming up in the patient is no longer having abdominal pain or flank pain.  Patient is not febrile.  Patient's urine did not show acute UTI today.  Patient feels much better after liter of fluids and wishes to go home.  I instruct the patient to keep their follow-up appointment but to return to the ER in the meantime if they develop fever, intractable nausea vomiting, flank pain, or abdominal pain.  Patient and family state they understand agree with plan of care.      Patient Care Considerations:          Consultants/Shared Management Plan:    None    Social Determinants of Health:    Patient has presented with family members who are responsible, reliable and will ensure follow up care.      Disposition and Care Coordination:    Discharged: I considered escalation of care by admitting this patient to the hospital, however the patient has improved and is suitable and stable for discharge.    I have explained the patient´s condition, diagnoses and treatment plan based on the information available to me at this time. I have answered questions and addressed any concerns. The patient has a good  understanding of the patient´s diagnosis,  condition, and treatment plan as can be expected at this point. The vital signs have been stable. The patient´s condition is stable and appropriate for discharge from the emergency department.      The patient will pursue further outpatient evaluation with the primary care physician or other designated or consulting physician as outlined in the discharge instructions. They are agreeable to this plan of care and follow-up instructions have been explained in detail. The patient has received these instructions in written format and have expressed an understanding of the discharge instructions. The patient is aware that any significant change in condition or worsening of symptoms should prompt an immediate return to this or the closest emergency department or call to 911.  I have explained discharge medications and the need for follow up with the patient/caretakers. This was also printed in the discharge instructions. Patient was discharged with the following medications and follow up:      Medication List      No changes were made to your prescriptions during this visit.      Provider, No Known  [facility] [id number] [phone]    Call in 2 days  As needed       Final diagnoses:   Dysuria        ED Disposition       ED Disposition   Discharge    Condition   Stable    Comment   --               This medical record created using voice recognition software.             Kristopher Peterson PA-C  08/29/23 1828

## 2023-09-20 NOTE — PROGRESS NOTES
Chief Complaint        Fecal incontinence     History of Present Illness      Ascencion Murphy is a 83 y.o. male who presents to [facility] as a new patient establishing care with gastroenterology.  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  Patient reports that he did have part of his colon removed but he is unsure what part or how much as this was many years ago.  Patient presents to the office today with his daughter-in-law which is helpful in providing history.  She reports that he has fairly normal bowel movements daily but does experience incontinence at times.  He usually has 1 bowel movement per day that is normal without melena or hematochezia.  He does not experience any trouble swallowing and denies reflux.  Patient reports that he eats slowly which aids in his digestion.  Patient voices no concerns at this time.  Patient denies abdominal pain, fever, nausea, vomiting, weight loss, night sweats, melena, hematochezia, hematemesis.  No colon or EGD on file for this patient  Colonoscopy reported last year at [facility] with normal results.    CT abdomen and pelvis on 02.06.2023  5 mm stone layering dependently within the bladder, adjacent to the right UVJ, may represent a   recently passed stone.  No evidence of hydronephrosis or obstructive uropathy is identified.    XR abdomen KUB on 02.06.2023  1. Bilateral renal calculi are suggested. CT may be of benefit to reassess.     Most recent labs on 08.29.2023  Results       Result Review :   The following data was reviewed by: JAIME Marie on 09/21/2023     CMP          2/6/2023    15:12 5/1/2023    14:33 8/29/2023    14:38   CMP   Glucose 208  149  225    BUN 16  21  21    Creatinine 1.02  1.59  1.06    EGFR 73.4  42.8  69.6    Sodium 137  136  137    Potassium 4.0  5.0  4.1    Chloride 103  103  103    Calcium 8.9  9.8  9.3    Total  "Protein 6.6   6.9    Albumin 3.7   3.9    Globulin 2.9   3.0    Total Bilirubin 0.6   0.7    Alkaline Phosphatase 87   80    AST (SGOT) 23   22    ALT (SGPT) 19   10    Albumin/Globulin Ratio 1.3   1.3    BUN/Creatinine Ratio 15.7  13.2  19.8    Anion Gap 12.0  7.7  10.8      CBC          2/6/2023    15:12 8/29/2023    14:38   CBC   WBC 7.45  6.87    RBC 4.05  4.31    Hemoglobin 13.1  14.1    Hematocrit 38.9  42.1    MCV 96.0  97.7    MCH 32.3  32.7    MCHC 33.7  33.5    RDW 13.1  12.5    Platelets 190  200        Iron Profile No results found for: IRON, TIBC, LABIRON, TRANSFERRIN  Ferritin No results found for: FERRITIN         Past Medical History       Past Medical History:   Diagnosis Date    Hyperlipidemia        Past Surgical History:   Procedure Laterality Date    APPENDECTOMY      COLON SURGERY      COLONOSCOPY  2022    in [place]    EYE SURGERY           Current Outpatient Medications:     aspirin 81 MG EC tablet, Take 1 tablet by mouth Daily., Disp: , Rfl:     dutasteride (AVODART) 0.5 MG capsule, Take 1 capsule by mouth Daily., Disp: , Rfl:     tamsulosin (FLOMAX) 0.4 MG capsule 24 hr capsule, Take 1 capsule by mouth Daily., Disp: , Rfl:     vitamin B-12 (CYANOCOBALAMIN) 500 MCG tablet, Take 1 tablet by mouth Daily., Disp: , Rfl:     mirtazapine (REMERON) 15 MG tablet, take 0.5  tablet by oral route  every day before bedtime (Patient not taking: Reported on 9/21/2023), Disp: , Rfl:     simvastatin (ZOCOR) 10 MG tablet, Take 1 tablet by mouth. (Patient not taking: Reported on 9/21/2023), Disp: , Rfl:      Allergies   Allergen Reactions    Penicillins Unknown - Low Severity       Family History   Problem Relation Age of Onset    Colon cancer Mother     Colon cancer Father         Social History     Social History Narrative    Not on file       Objective       Objective     Vital Signs:   /70 (BP Location: Right arm, Patient Position: Sitting, Cuff Size: Adult)   Pulse 64   Ht 180.3 cm (71\")   Wt " 104 kg (228 lb 4.8 oz)   SpO2 99%   BMI 31.84 kg/m²     Body mass index is 31.84 kg/m².    Physical Exam  Exam conducted with a chaperone present.   Constitutional:       General: He is not in acute distress.     Appearance: Normal appearance. He is well-developed and normal weight.   Eyes:      Conjunctiva/sclera: Conjunctivae normal.      Pupils: Pupils are equal, round, and reactive to light.      Visual Fields: Right eye visual fields normal and left eye visual fields normal.   Cardiovascular:      Rate and Rhythm: Normal rate and regular rhythm.      Heart sounds: Normal heart sounds.   Pulmonary:      Effort: Pulmonary effort is normal. No retractions.      Breath sounds: Normal breath sounds and air entry.      Comments: Inspection of chest: normal appearance  Abdominal:      General: Bowel sounds are normal.      Palpations: Abdomen is soft.      Tenderness: There is no abdominal tenderness.      Comments: No appreciable hepatosplenomegaly   Musculoskeletal:      Cervical back: Neck supple.      Right lower leg: No edema.      Left lower leg: No edema.   Lymphadenopathy:      Cervical: No cervical adenopathy.   Skin:     Findings: No lesion.      Comments: Turgor normal   Neurological:      Mental Status: He is alert and oriented to person, place, and time.   Psychiatric:         Mood and Affect: Mood and affect normal.            Assessment & Plan          Assessment and Plan    Diagnoses and all orders for this visit:    1. Incontinence of feces, unspecified fecal incontinence type (Primary)    2. History of bowel resection      83-year-old male presenting the office today with a history of fecal incontinence, prior bowel resection related to intussusception as a child establishing care with gastroenterology.  Patient voices no concerns today and just wants to establish care.  He is experienced intermittent incontinence but is having a bowel movement once a day.  We have discussed setting times  throughout the day for bowel habit training.  We have discussed adding fiber to the patient's current regimen.  Patient will follow-up on an as-needed basis.  Patient agreeable to this plan will call with any questions or concerns.          Follow Up       Follow Up   Return if symptoms worsen or fail to improve.  Patient was given instructions and counseling regarding his condition or for health maintenance advice. Please see specific information pulled into the AVS if appropriate.

## 2023-09-21 ENCOUNTER — OFFICE VISIT (OUTPATIENT)
Dept: GASTROENTEROLOGY | Facility: CLINIC | Age: 83
End: 2023-09-21
Payer: MEDICARE

## 2023-09-21 VITALS
OXYGEN SATURATION: 99 % | WEIGHT: 228.3 LBS | SYSTOLIC BLOOD PRESSURE: 122 MMHG | BODY MASS INDEX: 31.96 KG/M2 | HEIGHT: 71 IN | DIASTOLIC BLOOD PRESSURE: 70 MMHG | HEART RATE: 64 BPM

## 2023-09-21 DIAGNOSIS — R15.9 INCONTINENCE OF FECES, UNSPECIFIED FECAL INCONTINENCE TYPE: Primary | ICD-10-CM

## 2023-09-21 DIAGNOSIS — Z90.49 HISTORY OF BOWEL RESECTION: ICD-10-CM

## 2023-09-21 RX ORDER — ASPIRIN 81 MG/1
81 TABLET ORAL DAILY
COMMUNITY

## 2023-09-21 RX ORDER — CHOLECALCIFEROL (VITAMIN D3) 125 MCG
500 CAPSULE ORAL DAILY
COMMUNITY

## 2023-09-27 ENCOUNTER — PATIENT ROUNDING (BHMG ONLY) (OUTPATIENT)
Dept: GASTROENTEROLOGY | Facility: CLINIC | Age: 83
End: 2023-09-27
Payer: MEDICARE

## 2023-09-27 NOTE — PROGRESS NOTES
"9/27/2023      Hello, may I speak with Ascencion Murphy     My name is Yulissa. I am calling from [facility] [place]. I show that you had a recent visit with JAIME Marie.    Before we get started may I verify your date of birth? 1940    I am calling to officially welcome you to our practice and ask about your recent visit. Is this a good time to talk? Yes spoke with Rin on [facility]    Tell me about your visit with us. What things went well? \"Everything went well, we didn't have to wait long, it was a good visit\"    We strive to ensure that we protect your safety and privacy. Is there anything we could have done to improve this during your visit?    Yes     We're always looking for ways to make our patients' experiences even better. Do you have recommendations on ways we may improve? No     Overall were you satisfied with your first visit to our practice?  Yes    I appreciate you taking the time to speak with me today. Is there anything else I can do for you? No     I am glad to hear that you had a very good visit and I appreciate you taking the time to provide feedback on this call. We would greatly appreciate you filling out a survey if you receive one in the mail, email or text. This is a great opportunity to provide any additional feedback that you may think of after this call as well.       Thank you, and have a great day.   "

## 2023-09-28 ENCOUNTER — TRANSCRIBE ORDERS (OUTPATIENT)
Dept: LAB | Facility: HOSPITAL | Age: 83
End: 2023-09-28
Payer: MEDICARE

## 2023-09-28 ENCOUNTER — LAB (OUTPATIENT)
Dept: LAB | Facility: HOSPITAL | Age: 83
End: 2023-09-28
Payer: MEDICARE

## 2023-09-28 DIAGNOSIS — E78.5 HYPERLIPIDEMIA, UNSPECIFIED HYPERLIPIDEMIA TYPE: ICD-10-CM

## 2023-09-28 DIAGNOSIS — I10 ESSENTIAL HYPERTENSION, MALIGNANT: ICD-10-CM

## 2023-09-28 DIAGNOSIS — E55.9 AVITAMINOSIS D: ICD-10-CM

## 2023-09-28 DIAGNOSIS — E78.5 HYPERLIPIDEMIA, UNSPECIFIED HYPERLIPIDEMIA TYPE: Primary | ICD-10-CM

## 2023-09-28 LAB
25(OH)D3 SERPL-MCNC: 32 NG/ML (ref 30–100)
ALBUMIN SERPL-MCNC: 4.2 G/DL (ref 3.5–5.2)
ALBUMIN/GLOB SERPL: 1.4 G/DL
ALP SERPL-CCNC: 86 U/L (ref 39–117)
ALT SERPL W P-5'-P-CCNC: 19 U/L (ref 1–41)
ANION GAP SERPL CALCULATED.3IONS-SCNC: 8.8 MMOL/L (ref 5–15)
AST SERPL-CCNC: 24 U/L (ref 1–40)
BASOPHILS # BLD AUTO: 0.03 10*3/MM3 (ref 0–0.2)
BASOPHILS NFR BLD AUTO: 0.5 % (ref 0–1.5)
BILIRUB CONJ SERPL-MCNC: 0.2 MG/DL (ref 0–0.3)
BILIRUB SERPL-MCNC: 0.9 MG/DL (ref 0–1.2)
BUN SERPL-MCNC: 16 MG/DL (ref 8–23)
BUN/CREAT SERPL: 15.5 (ref 7–25)
CALCIUM SPEC-SCNC: 9.7 MG/DL (ref 8.6–10.5)
CHLORIDE SERPL-SCNC: 105 MMOL/L (ref 98–107)
CHOLEST SERPL-MCNC: 176 MG/DL (ref 0–200)
CO2 SERPL-SCNC: 25.2 MMOL/L (ref 22–29)
CREAT SERPL-MCNC: 1.03 MG/DL (ref 0.76–1.27)
DEPRECATED RDW RBC AUTO: 43.7 FL (ref 37–54)
EGFRCR SERPLBLD CKD-EPI 2021: 72.1 ML/MIN/1.73
EOSINOPHIL # BLD AUTO: 0.2 10*3/MM3 (ref 0–0.4)
EOSINOPHIL NFR BLD AUTO: 3.1 % (ref 0.3–6.2)
ERYTHROCYTE [DISTWIDTH] IN BLOOD BY AUTOMATED COUNT: 12.1 % (ref 12.3–15.4)
GLOBULIN UR ELPH-MCNC: 3 GM/DL
GLUCOSE SERPL-MCNC: 87 MG/DL (ref 65–99)
HBA1C MFR BLD: 6.4 % (ref 4.8–5.6)
HCT VFR BLD AUTO: 45.7 % (ref 37.5–51)
HDLC SERPL-MCNC: 54 MG/DL (ref 40–60)
HGB BLD-MCNC: 15.4 G/DL (ref 13–17.7)
IMM GRANULOCYTES # BLD AUTO: 0.03 10*3/MM3 (ref 0–0.05)
IMM GRANULOCYTES NFR BLD AUTO: 0.5 % (ref 0–0.5)
LDLC SERPL CALC-MCNC: 108 MG/DL (ref 0–100)
LDLC/HDLC SERPL: 1.99 {RATIO}
LYMPHOCYTES # BLD AUTO: 2.28 10*3/MM3 (ref 0.7–3.1)
LYMPHOCYTES NFR BLD AUTO: 35.4 % (ref 19.6–45.3)
MCH RBC QN AUTO: 33 PG (ref 26.6–33)
MCHC RBC AUTO-ENTMCNC: 33.7 G/DL (ref 31.5–35.7)
MCV RBC AUTO: 98.1 FL (ref 79–97)
MONOCYTES # BLD AUTO: 0.59 10*3/MM3 (ref 0.1–0.9)
MONOCYTES NFR BLD AUTO: 9.2 % (ref 5–12)
NEUTROPHILS NFR BLD AUTO: 3.31 10*3/MM3 (ref 1.7–7)
NEUTROPHILS NFR BLD AUTO: 51.3 % (ref 42.7–76)
NRBC BLD AUTO-RTO: 0 /100 WBC (ref 0–0.2)
PLATELET # BLD AUTO: 214 10*3/MM3 (ref 140–450)
PMV BLD AUTO: 11.9 FL (ref 6–12)
POTASSIUM SERPL-SCNC: 4.6 MMOL/L (ref 3.5–5.2)
PROT SERPL-MCNC: 7.2 G/DL (ref 6–8.5)
RBC # BLD AUTO: 4.66 10*6/MM3 (ref 4.14–5.8)
SODIUM SERPL-SCNC: 139 MMOL/L (ref 136–145)
TRIGL SERPL-MCNC: 72 MG/DL (ref 0–150)
VIT B12 BLD-MCNC: 818 PG/ML (ref 211–946)
VLDLC SERPL-MCNC: 14 MG/DL (ref 5–40)
WBC NRBC COR # BLD: 6.44 10*3/MM3 (ref 3.4–10.8)

## 2023-09-28 PROCEDURE — 82306 VITAMIN D 25 HYDROXY: CPT

## 2023-09-28 PROCEDURE — 80061 LIPID PANEL: CPT

## 2023-09-28 PROCEDURE — 82248 BILIRUBIN DIRECT: CPT

## 2023-09-28 PROCEDURE — 83036 HEMOGLOBIN GLYCOSYLATED A1C: CPT

## 2023-09-28 PROCEDURE — 80053 COMPREHEN METABOLIC PANEL: CPT

## 2023-09-28 PROCEDURE — 36415 COLL VENOUS BLD VENIPUNCTURE: CPT

## 2023-09-28 PROCEDURE — 82607 VITAMIN B-12: CPT

## 2023-09-28 PROCEDURE — 85025 COMPLETE CBC W/AUTO DIFF WBC: CPT

## 2024-03-21 ENCOUNTER — APPOINTMENT (OUTPATIENT)
Dept: CT IMAGING | Facility: HOSPITAL | Age: 84
End: 2024-03-21
Payer: MEDICARE

## 2024-03-21 ENCOUNTER — APPOINTMENT (OUTPATIENT)
Dept: GENERAL RADIOLOGY | Facility: HOSPITAL | Age: 84
End: 2024-03-21
Payer: MEDICARE

## 2024-03-21 ENCOUNTER — HOSPITAL ENCOUNTER (INPATIENT)
Facility: HOSPITAL | Age: 84
LOS: 4 days | Discharge: NURSING, REHAB OR LONG-TERM CARE | End: 2024-03-26
Attending: EMERGENCY MEDICINE | Admitting: STUDENT IN AN ORGANIZED HEALTH CARE EDUCATION/TRAINING PROGRAM
Payer: MEDICARE

## 2024-03-21 DIAGNOSIS — K56.609 SMALL BOWEL OBSTRUCTION: Primary | ICD-10-CM

## 2024-03-21 DIAGNOSIS — R26.2 DIFFICULTY WALKING: ICD-10-CM

## 2024-03-21 DIAGNOSIS — R10.84 GENERALIZED ABDOMINAL PAIN: ICD-10-CM

## 2024-03-21 DIAGNOSIS — Z74.09 IMPAIRED MOBILITY AND ADLS: ICD-10-CM

## 2024-03-21 DIAGNOSIS — Z78.9 IMPAIRED MOBILITY AND ADLS: ICD-10-CM

## 2024-03-21 DIAGNOSIS — R11.2 NAUSEA AND VOMITING, UNSPECIFIED VOMITING TYPE: ICD-10-CM

## 2024-03-21 LAB
ALBUMIN SERPL-MCNC: 3.9 G/DL (ref 3.5–5.2)
ALBUMIN/GLOB SERPL: 1.1 G/DL
ALP SERPL-CCNC: 91 U/L (ref 39–117)
ALT SERPL W P-5'-P-CCNC: 12 U/L (ref 1–41)
ANION GAP SERPL CALCULATED.3IONS-SCNC: 9.3 MMOL/L (ref 5–15)
AST SERPL-CCNC: 23 U/L (ref 1–40)
BASOPHILS # BLD AUTO: 0.04 10*3/MM3 (ref 0–0.2)
BASOPHILS NFR BLD AUTO: 0.4 % (ref 0–1.5)
BILIRUB SERPL-MCNC: 0.7 MG/DL (ref 0–1.2)
BILIRUB UR QL STRIP: NEGATIVE
BUN SERPL-MCNC: 15 MG/DL (ref 8–23)
BUN/CREAT SERPL: 16.5 (ref 7–25)
CALCIUM SPEC-SCNC: 9.5 MG/DL (ref 8.6–10.5)
CHLORIDE SERPL-SCNC: 102 MMOL/L (ref 98–107)
CLARITY UR: CLEAR
CO2 SERPL-SCNC: 28.7 MMOL/L (ref 22–29)
COLOR UR: ABNORMAL
CREAT SERPL-MCNC: 0.91 MG/DL (ref 0.76–1.27)
D-LACTATE SERPL-SCNC: 1.7 MMOL/L (ref 0.5–2)
DEPRECATED RDW RBC AUTO: 46.4 FL (ref 37–54)
EGFRCR SERPLBLD CKD-EPI 2021: 83.6 ML/MIN/1.73
EOSINOPHIL # BLD AUTO: 0.13 10*3/MM3 (ref 0–0.4)
EOSINOPHIL NFR BLD AUTO: 1.4 % (ref 0.3–6.2)
ERYTHROCYTE [DISTWIDTH] IN BLOOD BY AUTOMATED COUNT: 12.9 % (ref 12.3–15.4)
FLUAV SUBTYP SPEC NAA+PROBE: NOT DETECTED
FLUBV RNA ISLT QL NAA+PROBE: NOT DETECTED
GLOBULIN UR ELPH-MCNC: 3.4 GM/DL
GLUCOSE SERPL-MCNC: 163 MG/DL (ref 65–99)
GLUCOSE UR STRIP-MCNC: NEGATIVE MG/DL
HCT VFR BLD AUTO: 42.9 % (ref 37.5–51)
HGB BLD-MCNC: 14.2 G/DL (ref 13–17.7)
HGB UR QL STRIP.AUTO: NEGATIVE
HOLD SPECIMEN: NORMAL
HOLD SPECIMEN: NORMAL
IMM GRANULOCYTES # BLD AUTO: 0.07 10*3/MM3 (ref 0–0.05)
IMM GRANULOCYTES NFR BLD AUTO: 0.8 % (ref 0–0.5)
KETONES UR QL STRIP: NEGATIVE
LEUKOCYTE ESTERASE UR QL STRIP.AUTO: NEGATIVE
LIPASE SERPL-CCNC: 15 U/L (ref 13–60)
LYMPHOCYTES # BLD AUTO: 0.97 10*3/MM3 (ref 0.7–3.1)
LYMPHOCYTES NFR BLD AUTO: 10.4 % (ref 19.6–45.3)
MCH RBC QN AUTO: 32.4 PG (ref 26.6–33)
MCHC RBC AUTO-ENTMCNC: 33.1 G/DL (ref 31.5–35.7)
MCV RBC AUTO: 97.9 FL (ref 79–97)
MONOCYTES # BLD AUTO: 0.84 10*3/MM3 (ref 0.1–0.9)
MONOCYTES NFR BLD AUTO: 9 % (ref 5–12)
NEUTROPHILS NFR BLD AUTO: 7.27 10*3/MM3 (ref 1.7–7)
NEUTROPHILS NFR BLD AUTO: 78 % (ref 42.7–76)
NITRITE UR QL STRIP: NEGATIVE
NRBC BLD AUTO-RTO: 0 /100 WBC (ref 0–0.2)
PH UR STRIP.AUTO: 6 [PH] (ref 5–8)
PLATELET # BLD AUTO: 208 10*3/MM3 (ref 140–450)
PMV BLD AUTO: 11 FL (ref 6–12)
POTASSIUM SERPL-SCNC: 4 MMOL/L (ref 3.5–5.2)
PROT SERPL-MCNC: 7.3 G/DL (ref 6–8.5)
PROT UR QL STRIP: ABNORMAL
RBC # BLD AUTO: 4.38 10*6/MM3 (ref 4.14–5.8)
RSV RNA NPH QL NAA+NON-PROBE: NOT DETECTED
SARS-COV-2 RNA RESP QL NAA+PROBE: NOT DETECTED
SODIUM SERPL-SCNC: 140 MMOL/L (ref 136–145)
SP GR UR STRIP: >=1.03 (ref 1–1.03)
UROBILINOGEN UR QL STRIP: ABNORMAL
WBC NRBC COR # BLD AUTO: 9.32 10*3/MM3 (ref 3.4–10.8)
WHOLE BLOOD HOLD COAG: NORMAL
WHOLE BLOOD HOLD SPECIMEN: NORMAL

## 2024-03-21 PROCEDURE — 84100 ASSAY OF PHOSPHORUS: CPT | Performed by: STUDENT IN AN ORGANIZED HEALTH CARE EDUCATION/TRAINING PROGRAM

## 2024-03-21 PROCEDURE — 87637 SARSCOV2&INF A&B&RSV AMP PRB: CPT

## 2024-03-21 PROCEDURE — 80053 COMPREHEN METABOLIC PANEL: CPT

## 2024-03-21 PROCEDURE — 85025 COMPLETE CBC W/AUTO DIFF WBC: CPT

## 2024-03-21 PROCEDURE — 81003 URINALYSIS AUTO W/O SCOPE: CPT

## 2024-03-21 PROCEDURE — 83880 ASSAY OF NATRIURETIC PEPTIDE: CPT | Performed by: STUDENT IN AN ORGANIZED HEALTH CARE EDUCATION/TRAINING PROGRAM

## 2024-03-21 PROCEDURE — 25510000001 IOPAMIDOL PER 1 ML: Performed by: EMERGENCY MEDICINE

## 2024-03-21 PROCEDURE — 99285 EMERGENCY DEPT VISIT HI MDM: CPT

## 2024-03-21 PROCEDURE — 74177 CT ABD & PELVIS W/CONTRAST: CPT

## 2024-03-21 PROCEDURE — 71045 X-RAY EXAM CHEST 1 VIEW: CPT

## 2024-03-21 PROCEDURE — 25010000002 ONDANSETRON PER 1 MG: Performed by: EMERGENCY MEDICINE

## 2024-03-21 PROCEDURE — 83735 ASSAY OF MAGNESIUM: CPT | Performed by: STUDENT IN AN ORGANIZED HEALTH CARE EDUCATION/TRAINING PROGRAM

## 2024-03-21 PROCEDURE — 83690 ASSAY OF LIPASE: CPT

## 2024-03-21 PROCEDURE — 83605 ASSAY OF LACTIC ACID: CPT

## 2024-03-21 RX ORDER — ONDANSETRON 2 MG/ML
4 INJECTION INTRAMUSCULAR; INTRAVENOUS ONCE
Status: COMPLETED | OUTPATIENT
Start: 2024-03-21 | End: 2024-03-21

## 2024-03-21 RX ORDER — SODIUM CHLORIDE 0.9 % (FLUSH) 0.9 %
10 SYRINGE (ML) INJECTION AS NEEDED
Status: DISCONTINUED | OUTPATIENT
Start: 2024-03-21 | End: 2024-03-26 | Stop reason: HOSPADM

## 2024-03-21 RX ADMIN — IOPAMIDOL 94 ML: 755 INJECTION, SOLUTION INTRAVENOUS at 23:09

## 2024-03-21 RX ADMIN — ONDANSETRON 4 MG: 2 INJECTION INTRAMUSCULAR; INTRAVENOUS at 22:53

## 2024-03-22 ENCOUNTER — APPOINTMENT (OUTPATIENT)
Dept: GENERAL RADIOLOGY | Facility: HOSPITAL | Age: 84
End: 2024-03-22
Payer: MEDICARE

## 2024-03-22 ENCOUNTER — APPOINTMENT (OUTPATIENT)
Dept: CARDIOLOGY | Facility: HOSPITAL | Age: 84
End: 2024-03-22
Payer: MEDICARE

## 2024-03-22 PROBLEM — K56.609 SBO (SMALL BOWEL OBSTRUCTION): Status: ACTIVE | Noted: 2024-03-22

## 2024-03-22 LAB
ALBUMIN SERPL-MCNC: 3.4 G/DL (ref 3.5–5.2)
ALBUMIN/GLOB SERPL: 1.1 G/DL
ALP SERPL-CCNC: 88 U/L (ref 39–117)
ALT SERPL W P-5'-P-CCNC: 8 U/L (ref 1–41)
ANION GAP SERPL CALCULATED.3IONS-SCNC: 7.6 MMOL/L (ref 5–15)
AST SERPL-CCNC: 21 U/L (ref 1–40)
BASOPHILS # BLD AUTO: 0.03 10*3/MM3 (ref 0–0.2)
BASOPHILS NFR BLD AUTO: 0.3 % (ref 0–1.5)
BH CV ECHO MEAS - AO MAX PG: 5.9 MMHG
BH CV ECHO MEAS - AO ROOT DIAM: 2.9 CM
BH CV ECHO MEAS - AO V2 MAX: 121 CM/SEC
BH CV ECHO MEAS - AVA(I,D): 2.3 CM2
BH CV ECHO MEAS - EDV(CUBED): 113.4 ML
BH CV ECHO MEAS - EDV(MOD-SP2): 97.3 ML
BH CV ECHO MEAS - EDV(MOD-SP4): 144 ML
BH CV ECHO MEAS - EF(MOD-BP): 69 %
BH CV ECHO MEAS - EF(MOD-SP2): 82 %
BH CV ECHO MEAS - EF(MOD-SP4): 75.8 %
BH CV ECHO MEAS - ESV(CUBED): 31.3 ML
BH CV ECHO MEAS - ESV(MOD-SP2): 17.5 ML
BH CV ECHO MEAS - ESV(MOD-SP4): 34.8 ML
BH CV ECHO MEAS - FS: 34.9 %
BH CV ECHO MEAS - IVS/LVPW: 1.02 CM
BH CV ECHO MEAS - IVSD: 1.07 CM
BH CV ECHO MEAS - LA DIMENSION: 3.5 CM
BH CV ECHO MEAS - LAT PEAK E' VEL: 4.8 CM/SEC
BH CV ECHO MEAS - LV DIASTOLIC VOL/BSA (35-75): 63.8 CM2
BH CV ECHO MEAS - LV MASS(C)D: 186.8 GRAMS
BH CV ECHO MEAS - LV MAX PG: 1.83 MMHG
BH CV ECHO MEAS - LV SYSTOLIC VOL/BSA (12-30): 15.4 CM2
BH CV ECHO MEAS - LV V1 MAX: 67.7 CM/SEC
BH CV ECHO MEAS - LVIDD: 4.8 CM
BH CV ECHO MEAS - LVIDS: 3.2 CM
BH CV ECHO MEAS - LVOT AREA: 4.2 CM2
BH CV ECHO MEAS - LVOT DIAM: 2.3 CM
BH CV ECHO MEAS - LVPWD: 1.05 CM
BH CV ECHO MEAS - MED PEAK E' VEL: 7.8 CM/SEC
BH CV ECHO MEAS - MV A MAX VEL: 76.4 CM/SEC
BH CV ECHO MEAS - MV DEC TIME: 0.25 SEC
BH CV ECHO MEAS - MV E MAX VEL: 64.3 CM/SEC
BH CV ECHO MEAS - MV E/A: 0.84
BH CV ECHO MEAS - PA ACC TIME: 0.09 SEC
BH CV ECHO MEAS - PA V2 MAX: 86.3 CM/SEC
BH CV ECHO MEAS - PI END-D VEL: 76.6 CM/SEC
BH CV ECHO MEAS - SI(MOD-SP2): 35.3 ML/M2
BH CV ECHO MEAS - SI(MOD-SP4): 48.4 ML/M2
BH CV ECHO MEAS - SV(MOD-SP2): 79.8 ML
BH CV ECHO MEAS - SV(MOD-SP4): 109.2 ML
BH CV ECHO MEAS - TR MAX PG: 17.6 MMHG
BH CV ECHO MEAS - TR MAX VEL: 210 CM/SEC
BH CV ECHO MEASUREMENTS AVERAGE E/E' RATIO: 10.21
BH CV XLRA - RV BASE: 6 CM
BH CV XLRA - RV MID: 4.9 CM
BH CV XLRA - TDI S': 16 CM/SEC
BILIRUB SERPL-MCNC: 0.8 MG/DL (ref 0–1.2)
BUN SERPL-MCNC: 12 MG/DL (ref 8–23)
BUN/CREAT SERPL: 15.4 (ref 7–25)
CALCIUM SPEC-SCNC: 9 MG/DL (ref 8.6–10.5)
CHLORIDE SERPL-SCNC: 104 MMOL/L (ref 98–107)
CO2 SERPL-SCNC: 27.4 MMOL/L (ref 22–29)
CREAT SERPL-MCNC: 0.78 MG/DL (ref 0.76–1.27)
DEPRECATED RDW RBC AUTO: 47.1 FL (ref 37–54)
EGFRCR SERPLBLD CKD-EPI 2021: 88.5 ML/MIN/1.73
EOSINOPHIL # BLD AUTO: 0.1 10*3/MM3 (ref 0–0.4)
EOSINOPHIL NFR BLD AUTO: 1 % (ref 0.3–6.2)
ERYTHROCYTE [DISTWIDTH] IN BLOOD BY AUTOMATED COUNT: 12.8 % (ref 12.3–15.4)
GLOBULIN UR ELPH-MCNC: 3.1 GM/DL
GLUCOSE SERPL-MCNC: 134 MG/DL (ref 65–99)
HCT VFR BLD AUTO: 42.1 % (ref 37.5–51)
HGB BLD-MCNC: 13.3 G/DL (ref 13–17.7)
IMM GRANULOCYTES # BLD AUTO: 0.04 10*3/MM3 (ref 0–0.05)
IMM GRANULOCYTES NFR BLD AUTO: 0.4 % (ref 0–0.5)
IVRT: 169 MS
LYMPHOCYTES # BLD AUTO: 0.96 10*3/MM3 (ref 0.7–3.1)
LYMPHOCYTES NFR BLD AUTO: 10.1 % (ref 19.6–45.3)
MAGNESIUM SERPL-MCNC: 1.7 MG/DL (ref 1.6–2.4)
MAGNESIUM SERPL-MCNC: 1.8 MG/DL (ref 1.6–2.4)
MCH RBC QN AUTO: 31.5 PG (ref 26.6–33)
MCHC RBC AUTO-ENTMCNC: 31.6 G/DL (ref 31.5–35.7)
MCV RBC AUTO: 99.8 FL (ref 79–97)
MONOCYTES # BLD AUTO: 0.73 10*3/MM3 (ref 0.1–0.9)
MONOCYTES NFR BLD AUTO: 7.7 % (ref 5–12)
NEUTROPHILS NFR BLD AUTO: 7.68 10*3/MM3 (ref 1.7–7)
NEUTROPHILS NFR BLD AUTO: 80.5 % (ref 42.7–76)
NRBC BLD AUTO-RTO: 0 /100 WBC (ref 0–0.2)
NT-PROBNP SERPL-MCNC: 85.8 PG/ML (ref 0–1800)
PHOSPHATE SERPL-MCNC: 3 MG/DL (ref 2.5–4.5)
PHOSPHATE SERPL-MCNC: 3.4 MG/DL (ref 2.5–4.5)
PLATELET # BLD AUTO: 199 10*3/MM3 (ref 140–450)
PMV BLD AUTO: 11.4 FL (ref 6–12)
POTASSIUM SERPL-SCNC: 4 MMOL/L (ref 3.5–5.2)
PROT SERPL-MCNC: 6.5 G/DL (ref 6–8.5)
RBC # BLD AUTO: 4.22 10*6/MM3 (ref 4.14–5.8)
SODIUM SERPL-SCNC: 139 MMOL/L (ref 136–145)
WBC NRBC COR # BLD AUTO: 9.54 10*3/MM3 (ref 3.4–10.8)

## 2024-03-22 PROCEDURE — 93306 TTE W/DOPPLER COMPLETE: CPT

## 2024-03-22 PROCEDURE — 25010000002 HEPARIN (PORCINE) PER 1000 UNITS: Performed by: STUDENT IN AN ORGANIZED HEALTH CARE EDUCATION/TRAINING PROGRAM

## 2024-03-22 PROCEDURE — 80053 COMPREHEN METABOLIC PANEL: CPT | Performed by: STUDENT IN AN ORGANIZED HEALTH CARE EDUCATION/TRAINING PROGRAM

## 2024-03-22 PROCEDURE — 99223 1ST HOSP IP/OBS HIGH 75: CPT | Performed by: STUDENT IN AN ORGANIZED HEALTH CARE EDUCATION/TRAINING PROGRAM

## 2024-03-22 PROCEDURE — 85025 COMPLETE CBC W/AUTO DIFF WBC: CPT | Performed by: STUDENT IN AN ORGANIZED HEALTH CARE EDUCATION/TRAINING PROGRAM

## 2024-03-22 PROCEDURE — 83735 ASSAY OF MAGNESIUM: CPT | Performed by: STUDENT IN AN ORGANIZED HEALTH CARE EDUCATION/TRAINING PROGRAM

## 2024-03-22 PROCEDURE — 84100 ASSAY OF PHOSPHORUS: CPT | Performed by: STUDENT IN AN ORGANIZED HEALTH CARE EDUCATION/TRAINING PROGRAM

## 2024-03-22 PROCEDURE — 74018 RADEX ABDOMEN 1 VIEW: CPT

## 2024-03-22 PROCEDURE — 99221 1ST HOSP IP/OBS SF/LOW 40: CPT | Performed by: NURSE PRACTITIONER

## 2024-03-22 PROCEDURE — 25010000002 SULFUR HEXAFLUORIDE MICROSPH 60.7-25 MG RECONSTITUTED SUSPENSION: Performed by: INTERNAL MEDICINE

## 2024-03-22 PROCEDURE — 25010000002 MORPHINE PER 10 MG: Performed by: STUDENT IN AN ORGANIZED HEALTH CARE EDUCATION/TRAINING PROGRAM

## 2024-03-22 PROCEDURE — 25810000003 LACTATED RINGERS SOLUTION: Performed by: PHYSICIAN ASSISTANT

## 2024-03-22 PROCEDURE — 93306 TTE W/DOPPLER COMPLETE: CPT | Performed by: STUDENT IN AN ORGANIZED HEALTH CARE EDUCATION/TRAINING PROGRAM

## 2024-03-22 RX ORDER — SODIUM CHLORIDE 9 MG/ML
40 INJECTION, SOLUTION INTRAVENOUS AS NEEDED
Status: DISCONTINUED | OUTPATIENT
Start: 2024-03-22 | End: 2024-03-26 | Stop reason: HOSPADM

## 2024-03-22 RX ORDER — SODIUM CHLORIDE 0.9 % (FLUSH) 0.9 %
10 SYRINGE (ML) INJECTION AS NEEDED
Status: DISCONTINUED | OUTPATIENT
Start: 2024-03-22 | End: 2024-03-26 | Stop reason: HOSPADM

## 2024-03-22 RX ORDER — SODIUM CHLORIDE 0.9 % (FLUSH) 0.9 %
10 SYRINGE (ML) INJECTION EVERY 12 HOURS SCHEDULED
Status: DISCONTINUED | OUTPATIENT
Start: 2024-03-22 | End: 2024-03-26 | Stop reason: HOSPADM

## 2024-03-22 RX ORDER — NALOXONE HCL 0.4 MG/ML
0.4 VIAL (ML) INJECTION
Status: DISCONTINUED | OUTPATIENT
Start: 2024-03-22 | End: 2024-03-24

## 2024-03-22 RX ORDER — MORPHINE SULFATE 2 MG/ML
1 INJECTION, SOLUTION INTRAMUSCULAR; INTRAVENOUS EVERY 4 HOURS PRN
Status: DISCONTINUED | OUTPATIENT
Start: 2024-03-22 | End: 2024-03-24

## 2024-03-22 RX ORDER — NITROGLYCERIN 0.4 MG/1
0.4 TABLET SUBLINGUAL
Status: DISCONTINUED | OUTPATIENT
Start: 2024-03-22 | End: 2024-03-26 | Stop reason: HOSPADM

## 2024-03-22 RX ORDER — NYSTATIN 100000 [USP'U]/G
POWDER TOPICAL EVERY 12 HOURS SCHEDULED
Status: DISCONTINUED | OUTPATIENT
Start: 2024-03-22 | End: 2024-03-26 | Stop reason: HOSPADM

## 2024-03-22 RX ORDER — HEPARIN SODIUM 5000 [USP'U]/ML
5000 INJECTION, SOLUTION INTRAVENOUS; SUBCUTANEOUS EVERY 8 HOURS SCHEDULED
Status: DISCONTINUED | OUTPATIENT
Start: 2024-03-22 | End: 2024-03-26 | Stop reason: HOSPADM

## 2024-03-22 RX ADMIN — HEPARIN SODIUM 5000 UNITS: 5000 INJECTION INTRAVENOUS; SUBCUTANEOUS at 22:03

## 2024-03-22 RX ADMIN — SULFUR HEXAFLUORIDE 3 ML: KIT at 12:18

## 2024-03-22 RX ADMIN — MORPHINE SULFATE 1 MG: 2 INJECTION, SOLUTION INTRAMUSCULAR; INTRAVENOUS at 04:51

## 2024-03-22 RX ADMIN — Medication 10 ML: at 09:05

## 2024-03-22 RX ADMIN — Medication 10 ML: at 22:03

## 2024-03-22 RX ADMIN — SODIUM CHLORIDE, POTASSIUM CHLORIDE, SODIUM LACTATE AND CALCIUM CHLORIDE 1000 ML: 600; 310; 30; 20 INJECTION, SOLUTION INTRAVENOUS at 21:30

## 2024-03-22 RX ADMIN — HEPARIN SODIUM 5000 UNITS: 5000 INJECTION INTRAVENOUS; SUBCUTANEOUS at 05:02

## 2024-03-22 RX ADMIN — NYSTATIN: 100000 POWDER TOPICAL at 22:08

## 2024-03-22 RX ADMIN — HEPARIN SODIUM 5000 UNITS: 5000 INJECTION INTRAVENOUS; SUBCUTANEOUS at 14:46

## 2024-03-22 NOTE — SIGNIFICANT NOTE
Wound Eval / Progress Noted    [facility]     Patient Name: Ascencion Murphy  : 1940  MRN: [id number]  Today's Date: 3/22/2024                 Admit Date: 3/21/2024    Visit Dx:    ICD-10-CM ICD-9-CM   1. Small bowel obstruction  K56.609 560.9   2. Generalized abdominal pain  R10.84 789.07   3. Nausea and vomiting, unspecified vomiting type  R11.2 787.01         SBO (small bowel obstruction)        Past Medical History:   Diagnosis Date    Cancer     skin Cancer    Enlarged prostate     hx obtained from info sheet from [employer]    Hyperlipidemia     Hypertension     from  visit office note of         Past Surgical History:   Procedure Laterality Date    APPENDECTOMY      COLON SURGERY      part of bowel removed    COLONOSCOPY      in [address]    EYE SURGERY      cataract surgery         Physical Assessment:  Wound 24 0400 Right anterior groin Other (comment) (Active)   Wound Image   24 0451   Dressing Appearance open to air 24 1356   Closure None 24 1356   Base moist;red;yeast 24 1356   Red (%), Wound Tissue Color 100 24 0905   Periwound moist;redness;yeast 24 1356   Periwound Temperature warm 24 1356   Periwound Skin Turgor soft 24 1356   Edges rolled/closed 24 1356   Drainage Characteristics/Odor yellow;creamy 24 1356   Drainage Amount scant 24 1356   Care, Wound cleansed with;soap and water 24 1356   Dressing Care open to air 24 1356       Wound 24 0400 Left anterior groin Other (comment) (Active)   Wound Image   24 0451   Dressing Appearance open to air 24 1356   Closure None 24 1356   Base moist;red;yeast 24 1356   Periwound moist;redness;yeast 24 1356   Periwound Temperature warm 24 1356   Periwound Skin Turgor soft 24 1356   Edges rolled/closed 24 1356   Drainage Characteristics/Odor yellow;creamy 24 1356   Drainage Amount scant 24 6971    Care, Wound cleansed with;soap and water 03/22/24 1356   Dressing Care open to air 03/22/24 1356       Wound 03/22/24 0400 Bilateral gluteal Other (comment) (Active)   Wound Image   03/22/24 1356   Pressure Injury Stage 1 03/22/24 1356   Dressing Appearance open to air 03/22/24 1356   Closure None 03/22/24 1356   Base dry;red;non-blanchable 03/22/24 1356   Periwound dry;intact;pink;blanchable 03/22/24 1356   Periwound Skin Turgor soft 03/22/24 1356   Edges rolled/closed 03/22/24 1356   Drainage Amount none 03/22/24 1356   Care, Wound cleansed with;soap and water 03/22/24 1356   Dressing Care open to air 03/22/24 1356      Wound Check / Follow-up: Patient seen today for wound consult. Patient is awake and alert at time of visit. He is agreeable to assessment. NG tube noted to left nare. No evidence of pressure injury at this time.     Moisture, redness, and fungal presentation noted to bilateral groin folds. No open tissue noted. Recommending skin care and topical treatment with application of antifungal powder.     Small stage I pressure injury noted to to left gluteal aspect. Intact, red, non-blanchable tissue noted. Recommending skin protection with application of barrier cream.    Heels intact without discoloration.    Recommending to implement every two hour turns, offload heels, keep patient free from moisture.       Impression: Moisture, redness, and fungal presentation to bilateral groin folds / creases. Stage I pressure injury to left gluteal aspect.      Short term goals: Regain skin integrity, skin protection, moisture prevention, pressure reduction, topical treatment, skin care.      Sharmin Ortiz RN    3/22/2024    17:17 EDT

## 2024-03-22 NOTE — CONSULTS
History and Physical    Patient Name:  Ascencion Murphy  YOB: 1940  [id number]    Referring Provider: Dr. Hill      Patient Care Team:  Prudencio Naylor MD as PCP - General (Internal Medicine)    Reason for consult/Chief complaint:  bowel obstruction    Subjective .     History of present illness:  Ascencion Murphy is a 83 y.o. gentleman who presented to the ED at [facility] overnight with abdominal pain that started yesterday.  The pain was accompanied by nausea, vomiting, and bloating.  He had a CT  scan that indicates he has a bowel obstruction.  He had NG tube placed overnight and says his abdomen feels better today.  No flatus or BM.       History:  Past Medical History:   Diagnosis Date    Cancer     skin Cancer    Enlarged prostate     hx obtained from info sheet from [employer]    Hyperlipidemia     Hypertension     from 12/23 visit office note of        Past Surgical History:   Procedure Laterality Date    APPENDECTOMY      COLON SURGERY      part of bowel removed    COLONOSCOPY  2022    in [place]    EYE SURGERY      cataract surgery       Family History   Problem Relation Age of Onset    Colon cancer Mother     Colon cancer Father        Social History     Tobacco Use    Smoking status: Never     Passive exposure: Never    Smokeless tobacco: Never   Vaping Use    Vaping status: Never Used   Substance Use Topics    Alcohol use: Never    Drug use: Never       Review of Systems:  A complete ROS was performed and all are negative except for what is documented in the HPI.    MEDS:  Prior to Admission medications    Medication Sig Start Date End Date Taking? Authorizing Provider   dutasteride (AVODART) 0.5 MG capsule Take 1 capsule by mouth Daily.   Yes Salud Hathaway MD   simvastatin (ZOCOR) 10 MG tablet Take 1 tablet by mouth Every Night.   Yes Salud Hathaway MD   tamsulosin (FLOMAX) 0.4 MG capsule 24 hr capsule Take 1 capsule by mouth Daily.   Yes Salud Hathaway MD  "  aspirin 81 MG EC tablet Take 1 tablet by mouth Daily.    Provider, MD Salud   vitamin B-12 (CYANOCOBALAMIN) 500 MCG tablet Take 1 tablet by mouth Daily.    Provider, MD Salud   mirtazapine (REMERON) 15 MG tablet take 0.5  tablet by oral route  every day before bedtime  Patient not taking: Reported on 9/21/2023 5/15/23 3/22/24  Provider, MD Salud        heparin (porcine), 5,000 Units, Subcutaneous, Q8H  sodium chloride, 10 mL, Intravenous, Q12H               Allergies:  Penicillins    Objective         Physical Exam:      Constitutional:  well nourished, no acute distress, appears stated age /68 (BP Location: Left arm, Patient Position: Lying)   Pulse 65   Temp 98.1 °F (36.7 °C) (Oral)   Resp 16   Ht 180.3 cm (70.98\")   Wt 107 kg (235 lb 14.3 oz)   SpO2 94%   BMI 32.92 kg/m²    Eyes:  anicteric sclerae, moist conjunctivae, no lid lag, PERRLA  ENMT:  oropharynx clear, moist mucous membranes, good dentition  Neck:   full ROM, trachea midline, no thyromegaly  Cardiovascular: RRR, S1 and S2 present, no MRG, heart rate 65, no pedal edema  Respiratory: lungs CTA, respirations even and unlabored  GI:  Abdomen soft, nontender, distended, no HSM     Skin:  warm and dry, normal turgor, no rashes  Psychiatric:  alert and oriented x3, intact judgment and insight, cooperative         Results Review: I have reviewed the patient's labs and imaging including CBC, CMP, CT of abd and pelvis    LABS/IMAGING:    WBC   Date Value Ref Range Status   03/22/2024 9.54 3.40 - 10.80 10*3/mm3 Final     RBC   Date Value Ref Range Status   03/22/2024 4.22 4.14 - 5.80 10*6/mm3 Final     Hemoglobin   Date Value Ref Range Status   03/22/2024 13.3 13.0 - 17.7 g/dL Final     Hematocrit   Date Value Ref Range Status   03/22/2024 42.1 37.5 - 51.0 % Final     MCV   Date Value Ref Range Status   03/22/2024 99.8 (H) 79.0 - 97.0 fL Final     MCH   Date Value Ref Range Status   03/22/2024 31.5 26.6 - 33.0 pg Final     [facility] "   Date Value Ref Range Status   03/22/2024 31.6 31.5 - 35.7 g/dL Final     RDW   Date Value Ref Range Status   03/22/2024 12.8 12.3 - 15.4 % Final     RDW-SD   Date Value Ref Range Status   03/22/2024 47.1 37.0 - 54.0 fl Final     MPV   Date Value Ref Range Status   03/22/2024 11.4 6.0 - 12.0 fL Final     Platelets   Date Value Ref Range Status   03/22/2024 199 140 - 450 10*3/mm3 Final     Neutrophil %   Date Value Ref Range Status   03/22/2024 80.5 (H) 42.7 - 76.0 % Final     Lymphocyte %   Date Value Ref Range Status   03/22/2024 10.1 (L) 19.6 - 45.3 % Final     Monocyte %   Date Value Ref Range Status   03/22/2024 7.7 5.0 - 12.0 % Final     Eosinophil %   Date Value Ref Range Status   03/22/2024 1.0 0.3 - 6.2 % Final     Basophil %   Date Value Ref Range Status   03/22/2024 0.3 0.0 - 1.5 % Final     Immature Grans %   Date Value Ref Range Status   03/22/2024 0.4 0.0 - 0.5 % Final     Neutrophils, Absolute   Date Value Ref Range Status   03/22/2024 7.68 (H) 1.70 - 7.00 10*3/mm3 Final     Lymphocytes, Absolute   Date Value Ref Range Status   03/22/2024 0.96 0.70 - 3.10 10*3/mm3 Final     Monocytes, Absolute   Date Value Ref Range Status   03/22/2024 0.73 0.10 - 0.90 10*3/mm3 Final     Eosinophils, Absolute   Date Value Ref Range Status   03/22/2024 0.10 0.00 - 0.40 10*3/mm3 Final     Basophils, Absolute   Date Value Ref Range Status   03/22/2024 0.03 0.00 - 0.20 10*3/mm3 Final     Immature Grans, Absolute   Date Value Ref Range Status   03/22/2024 0.04 0.00 - 0.05 10*3/mm3 Final     nRBC   Date Value Ref Range Status   03/22/2024 0.0 0.0 - 0.2 /100 WBC Final        Basic Metabolic Panel    Sodium Sodium   Date Value Ref Range Status   03/22/2024 139 136 - 145 mmol/L Final   03/21/2024 140 136 - 145 mmol/L Final      Potassium Potassium   Date Value Ref Range Status   03/22/2024 4.0 3.5 - 5.2 mmol/L Final   03/21/2024 4.0 3.5 - 5.2 mmol/L Final      Chloride Chloride   Date Value Ref Range Status   03/22/2024 104 98  "- 107 mmol/L Final   03/21/2024 102 98 - 107 mmol/L Final      Bicarbonate No results found for: \"PLASMABICARB\"   BUN BUN   Date Value Ref Range Status   03/22/2024 12 8 - 23 mg/dL Final   03/21/2024 15 8 - 23 mg/dL Final      Creatinine Creatinine   Date Value Ref Range Status   03/22/2024 0.78 0.76 - 1.27 mg/dL Final   03/21/2024 0.91 0.76 - 1.27 mg/dL Final      Calcium Calcium   Date Value Ref Range Status   03/22/2024 9.0 8.6 - 10.5 mg/dL Final   03/21/2024 9.5 8.6 - 10.5 mg/dL Final      Glucose      No components found for: \"GLUCOSE.*\"        Lab Results   Component Value Date    GLUCOSE 134 (H) 03/22/2024    BUN 12 03/22/2024    CREATININE 0.78 03/22/2024    BCR 15.4 03/22/2024    K 4.0 03/22/2024    CO2 27.4 03/22/2024    CALCIUM 9.0 03/22/2024    ALBUMIN 3.4 (L) 03/22/2024    AST 21 03/22/2024    ALT 8 03/22/2024          CT Abdomen Pelvis With Contrast    Result Date: 3/22/2024  CT ABDOMEN PELVIS W CONTRAST-  INDICATION: Generalized abdominal pain and distention. Nausea and vomiting.  TECHNIQUE: CT of the abdomen and pelvis with IV contrast. Coronal and sagittal reconstructions were obtained.  Radiation dose reduction techniques included automated exposure control or exposure modulation based on body size.  COMPARISON: 2/6/2023  FINDINGS: There is scarring/atelectasis in the lung bases. There is bilateral gynecomastia. There is coronary artery disease and atherosclerotic disease. No aortic aneurysm. Gallbladder is contracted. No biliary obstruction. Right renal cyst is unchanged. There do appear to be some small parapelvic cyst in the lower left kidney. No renal or ureteral stones are seen. There is no hydronephrosis. There is atrophy of the pancreas. Solid abdominal organs are otherwise normal. A tiny dependent bladder stone is again noted to the right of midline, unchanged. Prostate is mildly enlarged.  There is a trace amount of free fluid in the dependent pelvis, as well as adjacent to the liver. " There is lower colonic diverticulosis without diverticulitis or colitis. Stool burden appears within normal limits. The appendix is surgically absent. The stomach is distended with air and food debris. There are multiple dilated small bowel loops with some air-fluid levels noted. The distal ileum is decompressed. Findings are compatible with a distal small bowel obstruction. Relative zone of transition noted in the right lower abdomen. No focal obstructing lesion is identified. No free air. No adenopathy. Tiny fat-containing umbilical hernia is again seen.      Impression:  1. Distal small bowel obstruction with a relative transition zone in the right lower abdomen. No focal obstructing lesion is identified. 2. Trace amount of free fluid in the abdomen and pelvis, probably reactive. 3. Colonic diverticulosis without diverticulitis or colitis. Appendectomy. 4. Additional nonacute findings as above.    Electronically Signed By-Dr. Ed Ray MD On:3/22/2024 12:12 AM            Assessment & Plan         SBO (small bowel obstruction)    Cont NPO   Cont NG tube              Electronically signed by JAIME Puentes, 03/22/24, 10:13 AM EDT.

## 2024-03-22 NOTE — NURSING NOTE
Admission questions assisted by sister in law Bettina Walls SATURNINO Murphy.  Bettina Walls was asked if she could being in paper work noting her POA status, and she was in agreement.

## 2024-03-22 NOTE — PLAN OF CARE
Goal Outcome Evaluation:  Patient new admission.  Patient alert to self, patient able to make needs known.  Patient with c/o pain x1 this shift, see emar.  Patient with 16 F NG tube to left nostril, tolerating well, out put is brown. Patient very sleepy at this time, answering admission questions difficult. I reached out to [facility] for help with admission questions via telephone, and was told that the directors would most likely not be in until around [id number]. Continue plan of care.

## 2024-03-22 NOTE — ED PROVIDER NOTES
Time: 10:41 PM EDT  Date of encounter:  3/21/2024  Independent Historian/Clinical History and Information was obtained by:   Patient  Chief Complaint: Abdominal pain    History is limited by: N/A    History of Present Illness:  Patient is a 83 y.o. year old male who presents to the emergency department for evaluation of abdominal pain.  Patient is slightly vague historian.  The patient states that he began having abdominal pain yesterday.  He states that is constant.  He also notes abdominal distention.  The patient has nausea and has vomited twice.  He denies any coffee-ground emesis or hematic emesis.  The patient states that he feels like he has to pass gas but he could not.  The patient states his bowel movements are normal.  He denies any diarrhea, hematochezia or melena.  Patient states that he is passing stool.  The patient denies any urinary frequency, urgency or dysuria.  The patient's had no fever, rigors or myalgias.  Patient does note that he had surgery for intussusception previously.  The patient denies any chest pain, chest pressure or shortness of breath    HPI    Patient Care Team  Primary Care Provider: Prudencio Naylor MD    Past Medical History:     Allergies   Allergen Reactions    Penicillins Unknown - Low Severity     Past Medical History:   Diagnosis Date    Hyperlipidemia      Past Surgical History:   Procedure Laterality Date    APPENDECTOMY      COLON SURGERY      COLONOSCOPY  2022    in [place]    EYE SURGERY       Family History   Problem Relation Age of Onset    Colon cancer Mother     Colon cancer Father        Home Medications:  Prior to Admission medications    Medication Sig Start Date End Date Taking? Authorizing Provider   aspirin 81 MG EC tablet Take 1 tablet by mouth Daily.    ProviderSalud MD   dutasteride (AVODART) 0.5 MG capsule Take 1 capsule by mouth Daily.    ProviderSalud MD   mirtazapine (REMERON) 15 MG tablet take 0.5  tablet by oral route  every  "day before bedtime  Patient not taking: Reported on 9/21/2023 5/15/23   Salud Hathaway MD   simvastatin (ZOCOR) 10 MG tablet Take 1 tablet by mouth.  Patient not taking: Reported on 9/21/2023    Salud Hathaway MD   tamsulosin (FLOMAX) 0.4 MG capsule 24 hr capsule Take 1 capsule by mouth Daily.    Salud Hathaway MD   vitamin B-12 (CYANOCOBALAMIN) 500 MCG tablet Take 1 tablet by mouth Daily.    Salud Hathaway MD        Social History:   Social History     Tobacco Use    Smoking status: Never     Passive exposure: Never    Smokeless tobacco: Never   Vaping Use    Vaping status: Never Used   Substance Use Topics    Alcohol use: Never    Drug use: Never         Review of Systems:  Review of Systems   Constitutional:  Negative for chills, diaphoresis and fever.   HENT:  Negative for congestion, postnasal drip, rhinorrhea and sore throat.    Eyes:  Negative for photophobia.   Respiratory:  Negative for cough, chest tightness and shortness of breath.    Cardiovascular:  Positive for leg swelling. Negative for chest pain and palpitations.   Gastrointestinal:  Positive for abdominal distention, abdominal pain, nausea and vomiting. Negative for diarrhea.   Genitourinary:  Negative for difficulty urinating, dysuria, flank pain, frequency, hematuria and urgency.   Musculoskeletal:  Negative for neck pain and neck stiffness.   Skin:  Negative for pallor and rash.   Neurological:  Negative for dizziness, syncope, weakness, numbness and headaches.   Hematological:  Negative for adenopathy. Does not bruise/bleed easily.   Psychiatric/Behavioral: Negative.          Physical Exam:  /69 (BP Location: Left arm, Patient Position: Lying)   Pulse 66   Temp 97.8 °F (36.6 °C) (Oral)   Resp 16   Ht 180.3 cm (71\")   Wt 106 kg (233 lb 14.5 oz)   SpO2 95%   BMI 32.62 kg/m²     Physical Exam  Vitals and nursing note reviewed.   Constitutional:       General: He is not in acute distress.     Appearance: " Normal appearance. He is not ill-appearing, toxic-appearing or diaphoretic.   HENT:      Head: Normocephalic and atraumatic.      Mouth/Throat:      Mouth: Mucous membranes are moist.   Eyes:      Pupils: Pupils are equal, round, and reactive to light.   Cardiovascular:      Rate and Rhythm: Normal rate and regular rhythm.      Pulses: Normal pulses.           Carotid pulses are 2+ on the right side and 2+ on the left side.       Radial pulses are 2+ on the right side and 2+ on the left side.        Femoral pulses are 2+ on the right side and 2+ on the left side.       Popliteal pulses are 2+ on the right side and 2+ on the left side.        Dorsalis pedis pulses are 2+ on the right side and 2+ on the left side.        Posterior tibial pulses are 2+ on the right side and 2+ on the left side.      Heart sounds: Normal heart sounds. No murmur heard.  Pulmonary:      Effort: Pulmonary effort is normal. No accessory muscle usage, respiratory distress or retractions.      Breath sounds: Normal breath sounds. No wheezing, rhonchi or rales.   Abdominal:      General: Abdomen is flat. There is distension.      Palpations: Abdomen is soft. There is no mass or pulsatile mass.      Tenderness: There is generalized abdominal tenderness. There is no right CVA tenderness, left CVA tenderness, guarding or rebound.      Comments: No rigidity   Musculoskeletal:         General: No swelling, tenderness or deformity.      Cervical back: Neck supple. No tenderness.      Right lower leg: No edema.      Left lower leg: No edema.      Comments: The patient has pitting edema in both legs and changes consistent with venous stasis dermatitis   Skin:     General: Skin is warm and dry.      Capillary Refill: Capillary refill takes less than 2 seconds.      Coloration: Skin is not jaundiced or pale.      Findings: No erythema.   Neurological:      General: No focal deficit present.      Mental Status: He is alert and oriented to person, place,  and time. Mental status is at baseline.      Cranial Nerves: Cranial nerves 2-12 are intact. No cranial nerve deficit.      Sensory: Sensation is intact. No sensory deficit.      Motor: Motor function is intact. No weakness or pronator drift.      Coordination: Coordination is intact. Coordination normal.   Psychiatric:         Mood and Affect: Mood normal.         Behavior: Behavior normal.                  Procedures:  Procedures      Medical Decision Making:      Comorbidities that affect care:    Hyperlipidemia, BPH    External Notes reviewed:    None      The following orders were placed and all results were independently analyzed by me:  Orders Placed This Encounter   Procedures    COVID PRE-OP / PRE-PROCEDURE SCREENING ORDER (NO ISOLATION) - Swab, Nasopharynx    COVID-19, FLU A/B, RSV PCR 1 HR TAT - Swab, Nasopharynx    XR Chest 1 View    CT Abdomen Pelvis With Contrast    XR Abdomen KUB    Glady Draw    Comprehensive Metabolic Panel    Lipase    Urinalysis With Microscopic If Indicated (No Culture) - Urine, Clean Catch    Lactic Acid, Plasma    CBC Auto Differential    BNP    Magnesium    Phosphorus    NPO Diet NPO Type: Strict NPO    Undress & Gown    NG Tube to Low Wall Suction    Code Status and Medical Interventions:    General MD Inpatient Consult    Inpatient Hospitalist Consult    Insert Peripheral IV    Initiate Observation Status    CBC & Differential    Green Top (Gel)    Lavender Top    Gold Top - SST    Light Blue Top       Medications Given in the Emergency Department:  Medications   sodium chloride 0.9 % flush 10 mL (has no administration in time range)   ondansetron (ZOFRAN) injection 4 mg (4 mg Intravenous Given 3/21/24 2253)   iopamidol (ISOVUE-370) 76 % injection 100 mL (94 mL Intravenous Given 3/21/24 2300)        ED Course:         Labs:    Lab Results (last 24 hours)       Procedure Component Value Units Date/Time    COVID PRE-OP / PRE-PROCEDURE SCREENING ORDER (NO ISOLATION) - Swab,  Nasopharynx [[id number]]  (Normal) Collected: 03/21/24 1915    Specimen: Swab from Nasopharynx Updated: 03/21/24 2004    Narrative:      The following orders were created for panel order COVID PRE-OP / PRE-PROCEDURE SCREENING ORDER (NO ISOLATION) - Swab, Nasopharynx.  Procedure                               Abnormality         Status                     ---------                               -----------         ------                     COVID-19, FLU A/B, RSV P...[[id number]]  Normal              Final result                 Please view results for these tests on the individual orders.    COVID-19, FLU A/B, RSV PCR 1 HR TAT - Swab, Nasopharynx [[id number]]  (Normal) Collected: 03/21/24 1915    Specimen: Swab from Nasopharynx Updated: 03/21/24 2004     COVID19 Not Detected     Influenza A PCR Not Detected     Influenza B PCR Not Detected     RSV, PCR Not Detected    Narrative:      Fact sheet for providers: https://www.fda.gov/media/238014/download    Fact sheet for patients: https://www.fda.gov/media/854416/download    Test performed by PCR.    CBC & Differential [[id number]]  (Abnormal) Collected: 03/21/24 1925    Specimen: Blood Updated: 03/21/24 1933    Narrative:      The following orders were created for panel order CBC & Differential.  Procedure                               Abnormality         Status                     ---------                               -----------         ------                     CBC Auto Differential[[id number]]        Abnormal            Final result                 Please view results for these tests on the individual orders.    Comprehensive Metabolic Panel [[id number]]  (Abnormal) Collected: 03/21/24 1925    Specimen: Blood Updated: 03/21/24 1957     Glucose 163 mg/dL      BUN 15 mg/dL      Creatinine 0.91 mg/dL      Sodium 140 mmol/L      Potassium 4.0 mmol/L      Chloride 102 mmol/L      CO2 28.7 mmol/L      Calcium 9.5 mg/dL      Total Protein 7.3 g/dL      Albumin 3.9 g/dL       ALT (SGPT) 12 U/L      AST (SGOT) 23 U/L      Alkaline Phosphatase 91 U/L      Total Bilirubin 0.7 mg/dL      Globulin 3.4 gm/dL      A/G Ratio 1.1 g/dL      BUN/Creatinine Ratio 16.5     Anion Gap 9.3 mmol/L      eGFR 83.6 mL/min/1.73     Narrative:      GFR Normal >60  Chronic Kidney Disease <60  Kidney Failure <15    The GFR formula is only valid for adults with stable renal function between ages 18 and 70.    Lipase [[id number]]  (Normal) Collected: 03/21/24 1925    Specimen: Blood Updated: 03/21/24 1957     Lipase 15 U/L     Lactic Acid, Plasma [[id number]]  (Normal) Collected: 03/21/24 1925    Specimen: Blood Updated: 03/21/24 1954     Lactate 1.7 mmol/L     CBC Auto Differential [[id number]]  (Abnormal) Collected: 03/21/24 1925    Specimen: Blood Updated: 03/21/24 1933     WBC 9.32 10*3/mm3      RBC 4.38 10*6/mm3      Hemoglobin 14.2 g/dL      Hematocrit 42.9 %      MCV 97.9 fL      MCH 32.4 pg      MCHC 33.1 g/dL      RDW 12.9 %      RDW-SD 46.4 fl      MPV 11.0 fL      Platelets 208 10*3/mm3      Neutrophil % 78.0 %      Lymphocyte % 10.4 %      Monocyte % 9.0 %      Eosinophil % 1.4 %      Basophil % 0.4 %      Immature Grans % 0.8 %      Neutrophils, Absolute 7.27 10*3/mm3      Lymphocytes, Absolute 0.97 10*3/mm3      Monocytes, Absolute 0.84 10*3/mm3      Eosinophils, Absolute 0.13 10*3/mm3      Basophils, Absolute 0.04 10*3/mm3      Immature Grans, Absolute 0.07 10*3/mm3      nRBC 0.0 /100 WBC     BNP [[id number]]  (Normal) Collected: 03/21/24 1925    Specimen: Blood Updated: 03/22/24 0230     proBNP 85.8 pg/mL     Narrative:      This assay is used as an aid in the diagnosis of individuals suspected of having heart failure. It can be used as an aid in the diagnosis of acute decompensated heart failure (ADHF) in patients presenting with signs and symptoms of ADHF to the emergency department (ED). In addition, NT-proBNP of <300 pg/mL indicates ADHF is not likely.    Age Range Result Interpretation  NT-proBNP  Concentration (pg/mL:      <50             Positive            >450                   Gray                 300-450                    Negative             <300    50-75           Positive            >900                  Gray                300-900                  Negative            <300      >75             Positive            >1800                  Gray                300-1800                  Negative            <300    Magnesium [[id number]]  (Normal) Collected: 03/21/24 1925    Specimen: Blood Updated: 03/22/24 0228     Magnesium 1.8 mg/dL     Phosphorus [[id number]]  (Normal) Collected: 03/21/24 1925    Specimen: Blood Updated: 03/22/24 0228     Phosphorus 3.0 mg/dL     Urinalysis With Microscopic If Indicated (No Culture) - Urine, Clean Catch [[id number]]  (Abnormal) Collected: 03/21/24 1951    Specimen: Urine, Clean Catch Updated: 03/21/24 2025     Color, UA Dark Yellow     Appearance, UA Clear     pH, UA 6.0     Specific Gravity, UA >=1.030     Glucose, UA Negative     Ketones, UA Negative     Bilirubin, UA Negative     Blood, UA Negative     Protein, UA Trace     Leuk Esterase, UA Negative     Nitrite, UA Negative     Urobilinogen, UA 1.0 E.U./dL    Narrative:      Urine microscopic not indicated.             Imaging:    XR Abdomen KUB    Result Date: 3/22/2024  XR ABDOMEN KUB-  Date of Exam: 3/22/2024 1:10 AM  Indication: ng placement; K56.609-Unspecified intestinal obstruction, unspecified as to partial versus complete obstruction; R10.84-Generalized abdominal pain; R11.2-Nausea with vomiting, unspecified  Comparison: CT abdomen pelvis 3/21/2024  Findings: Tip of an NG tube is just past the GE junction with the sideport near the level of the GE junction. Consider advancing 5 to 10 cm. There is some distention of the stomach, and there are some dilated small bowel loops compatible with bowel obstruction seen by CT.      Impression: NG tube in the proximal stomach as described. Consider advancing 5 to 10  cm.   Electronically Signed By-Dr. Ed Ray MD On:3/22/2024 1:21 AM      CT Abdomen Pelvis With Contrast    Result Date: 3/22/2024  CT ABDOMEN PELVIS W CONTRAST-  INDICATION: Generalized abdominal pain and distention. Nausea and vomiting.  TECHNIQUE: CT of the abdomen and pelvis with IV contrast. Coronal and sagittal reconstructions were obtained.  Radiation dose reduction techniques included automated exposure control or exposure modulation based on body size.  COMPARISON: 2/6/2023  FINDINGS: There is scarring/atelectasis in the lung bases. There is bilateral gynecomastia. There is coronary artery disease and atherosclerotic disease. No aortic aneurysm. Gallbladder is contracted. No biliary obstruction. Right renal cyst is unchanged. There do appear to be some small parapelvic cyst in the lower left kidney. No renal or ureteral stones are seen. There is no hydronephrosis. There is atrophy of the pancreas. Solid abdominal organs are otherwise normal. A tiny dependent bladder stone is again noted to the right of midline, unchanged. Prostate is mildly enlarged.  There is a trace amount of free fluid in the dependent pelvis, as well as adjacent to the liver. There is lower colonic diverticulosis without diverticulitis or colitis. Stool burden appears within normal limits. The appendix is surgically absent. The stomach is distended with air and food debris. There are multiple dilated small bowel loops with some air-fluid levels noted. The distal ileum is decompressed. Findings are compatible with a distal small bowel obstruction. Relative zone of transition noted in the right lower abdomen. No focal obstructing lesion is identified. No free air. No adenopathy. Tiny fat-containing umbilical hernia is again seen.       1. Distal small bowel obstruction with a relative transition zone in the right lower abdomen. No focal obstructing lesion is identified. 2. Trace amount of free fluid in the abdomen and pelvis,  probably reactive. 3. Colonic diverticulosis without diverticulitis or colitis. Appendectomy. 4. Additional nonacute findings as above.    Electronically Signed By-Dr. Ed Ray MD On:3/22/2024 12:12 AM      XR Chest 1 View    Result Date: 3/21/2024  XR CHEST 1 VW-  Date of Exam: 3/21/2024 6:55 PM  Indication: runny nose/abd pain  Comparison: None available.  Findings: Heart size within normal limits. No infectious appearing consolidation, edema, effusion or pneumothorax. Probable minimal platelike opacities in the lung bases suggesting atelectasis versus scar. Aortic atherosclerotic disease. Degenerative related osseous changes.      Impression: No active pulmonary process.   Electronically Signed By-Johnny Peña MD On:3/21/2024 7:07 PM         Differential Diagnosis and Discussion:    Abdominal Pain: Based on the patient's signs and symptoms, I considered abdominal aortic aneurysm, small bowel obstruction, pancreatitis, acute cholecystitis, acute appendecitis, peptic ulcer disease, gastritis, colitis, endocrine disorders, irritable bowel syndrome and other differential diagnosis an etiology of the patient's abdominal pain.    All labs were reviewed and interpreted by me.  CT scan radiology impression was interpreted by me.    MDM  Number of Diagnoses or Management Options  Generalized abdominal pain  Nausea and vomiting, unspecified vomiting type  Small bowel obstruction  Diagnosis management comments:   The patient's vital signs are stable in the emergency room.    The patient's urinalysis was negative for obvious infection or blood    The patient's CMP was reviewed and shows no abnormalities of critical concern.  Of note, the patient's sodium and potassium are acceptable.  The patient's liver enzymes are unremarkable.  The patient's renal function including creatinine is preserved.  The patient has a normal anion gap.    The patient's CBC was reviewed and shows no abnormalities of critical concern.   Of note, there is no anemia requiring a blood transfusion and the platelet count is acceptable    Patient had a normal lactate which indicates that tissue perfusion was likely adequate and decreases the possibility of ischemic bowel    CT scan of the abdomen demonstrates a distal small bowel obstruction with a relative transition zone in the right lower quadrant.  There is no obvious focal obstructing lesion.  Otherwise there is no other acute abnormalities    An NG tube was ordered and placed by the nursing staff.    At the time of admission, the patient is resting very comfortably no acute distress and nontoxic.  The patient describes his abdominal pain is minimal.  The patient's had no vomiting while in the emergency room       Amount and/or Complexity of Data Reviewed  Clinical lab tests: reviewed  Tests in the radiology section of CPT®: reviewed  Tests in the medicine section of CPT®: reviewed  Discuss the patient with other providers: yes (00:50 EDT  I discussed the case with Dr. Paul Naidu on-call for general surgery.  We have discussed the patient's presentation, examination, laboratory findings and CT findings.  He agrees with the NG tube.  He request the patient be admitted to the hospital under the hospitalist service and he will see the patient in consultation    00:51 EDT  I discussed the case with the hospitalist.  We have discussed the patient's presenting symptoms, laboratory values, imaging and condition at the time of admission.  They will evaluate the patient in the emergency room and admit the patient to the hospital)               Social Determinants of Health:    Patient is a nursing home/assisted living resident and has reliable access to care.      Disposition and Care Coordination:    Admit:   Through independent evaluation of the patient's history, physical, and imperical data, the patient meets criteria for inpatient admission to the hospital.        Final diagnoses:   Small bowel  obstruction   Generalized abdominal pain   Nausea and vomiting, unspecified vomiting type        ED Disposition       ED Disposition   Decision to Admit    Condition   --    Comment   Level of Care: Remote Telemetry [26]   Diagnosis: SBO (small bowel obstruction) [912805]                 This medical record created using voice recognition software.             Kristopher Hill DO  03/22/24 0233

## 2024-03-22 NOTE — PLAN OF CARE
Goal Outcome Evaluation:      VS WNL. NG tube remained on low intermittent wall suction. Tolerated well. Output for shift was 275.      Progress: improving

## 2024-03-22 NOTE — H&P
[facility] HISTORY AND PHYSICAL  Date: 3/22/2024   Patient Name: Ascencion Murphy  : 1940  MRN: [id number]  Primary Care Physician:  Prudencio Naylor MD  Date of admission: 3/21/2024    Subjective   Subjective     Chief Complaint: Abdominal pain    HPI:    Ascencion Murphy is a 83 y.o. male with a past medical history of BPH, hyperlipidemia presented to the ED for evaluation of diffuse abdominal pain that started yesterday.  Associated with nausea and vomiting x2, abdominal distention.  Not able to pass gas.  Last bowel movement 2 days ago.  Denies any hematemesis, hematochezia, melena.  States that abdominal pain has improved after the NG tube has been placed.  Denies any fevers, chills, chest pain, shortness of breath.    In the ED, temperature 97.8, pulse 74, respiratory 18, blood pressure 166/72 on room air saturating around 99% labs showed CBC, CMP with no significant findings, normal lactic acid, normal lipase, urinalysis is noninfectious.  COVID flu RSV negative.  Chest x-ray showed no acute abnormality.  CT abdomen pelvis with contrast showed distal small bowel obstruction with a relative transition zone in the right lower abdomen.  No focal obstructing lesion is identified.  Trace amount of free fluid in the abdominal pelvis probably reactive.  Colonic diverticulosis without diverticulitis or colitis.  Case has been discussed by the ED physician with general surgeon on-call Dr. Naidu, agreed to consult.  Patient has been admitted for further evaluation and management of small bowel obstruction.      Personal History     Past Medical History:   Diagnosis Date    Hyperlipidemia          Past Surgical History:   Procedure Laterality Date    APPENDECTOMY      COLON SURGERY      COLONOSCOPY      in [facility] SURGERY           Family History   Problem Relation Age of Onset    Colon cancer Mother     Colon cancer Father          Social History     Socioeconomic History    Marital  status: Single   Tobacco Use    Smoking status: Never     Passive exposure: Never    Smokeless tobacco: Never   Vaping Use    Vaping status: Never Used   Substance and Sexual Activity    Alcohol use: Never    Drug use: Never    Sexual activity: Defer         Home Medications:  aspirin, dutasteride, mirtazapine, simvastatin, tamsulosin, and vitamin B-12    Allergies:  Allergies   Allergen Reactions    Penicillins Unknown - Low Severity       Objective   Objective     Vitals:   Temp:  [97.8 °F (36.6 °C)] 97.8 °F (36.6 °C)  Heart Rate:  [69-74] 73  Resp:  [16-18] 18  BP: (128-166)/(66-83) 152/80    Physical Exam    Constitutional: Awake, alert, no acute distress   Eyes: Pupils equal, sclerae anicteric, no conjunctival injection   HENT: NCAT, mucous membranes moist   Respiratory: Clear to auscultation bilaterally, nonlabored respirations    Cardiovascular: RRR, no murmurs, rubs, or gallops, palpable pedal pulses bilaterally   Gastrointestinal: Positive bowel sounds, soft, mild diffuse tenderness, nondistended, no rigidity/guarding   Musculoskeletal: 2+ bilateral lower extremity edema, no clubbing or cyanosis to extremities   Neurologic: Oriented x 3, speech clear   Skin: No rashes     Result Review    Result Review:  I have personally reviewed the results from the time of this admission to 3/22/2024 02:00 EDT and agree with these findings:  [x]  Laboratory  []  Microbiology  [x]  Radiology  []  EKG/Telemetry   []  Cardiology/Vascular   []  Pathology  []  Old records  []  Other:        Imaging Results (Last 24 Hours)       Procedure Component Value Units Date/Time    XR Abdomen KUB [[id number]] Collected: 03/22/24 0120     Updated: 03/22/24 0124    Narrative:      XR ABDOMEN KUB-     Date of Exam: 3/22/2024 1:10 AM     Indication: ng placement; K56.609-Unspecified intestinal obstruction,  unspecified as to partial versus complete obstruction;  R10.84-Generalized abdominal pain; R11.2-Nausea with vomiting,  unspecified      Comparison: CT abdomen pelvis 3/21/2024     Findings:  Tip of an NG tube is just past the GE junction with the sideport near  the level of the GE junction. Consider advancing 5 to 10 cm. There is  some distention of the stomach, and there are some dilated small bowel  loops compatible with bowel obstruction seen by CT.       Impression:      Impression:  NG tube in the proximal stomach as described. Consider advancing 5 to 10  cm.        Electronically Signed By-Dr. dE Ray MD On:3/22/2024 1:21 AM       CT Abdomen Pelvis With Contrast [[id number]] Collected: 03/22/24 0003     Updated: 03/22/24 0014    Narrative:      CT ABDOMEN PELVIS W CONTRAST-     INDICATION:  Generalized abdominal pain and distention. Nausea and vomiting.     TECHNIQUE:  CT of the abdomen and pelvis with IV contrast. Coronal and sagittal  reconstructions were obtained.  Radiation dose reduction techniques  included automated exposure control or exposure modulation based on body  size.     COMPARISON:  2/6/2023     FINDINGS:  There is scarring/atelectasis in the lung bases. There is bilateral  gynecomastia. There is coronary artery disease and atherosclerotic  disease. No aortic aneurysm. Gallbladder is contracted. No biliary  obstruction. Right renal cyst is unchanged. There do appear to be some  small parapelvic cyst in the lower left kidney. No renal or ureteral  stones are seen. There is no hydronephrosis. There is atrophy of the  pancreas. Solid abdominal organs are otherwise normal. A tiny dependent  bladder stone is again noted to the right of midline, unchanged.  Prostate is mildly enlarged.     There is a trace amount of free fluid in the dependent pelvis, as well  as adjacent to the liver. There is lower colonic diverticulosis without  diverticulitis or colitis. Stool burden appears within normal limits.  The appendix is surgically absent. The stomach is distended with air and  food debris. There are multiple dilated small  bowel loops with some  air-fluid levels noted. The distal ileum is decompressed. Findings are  compatible with a distal small bowel obstruction. Relative zone of  transition noted in the right lower abdomen. No focal obstructing lesion  is identified. No free air. No adenopathy. Tiny fat-containing umbilical  hernia is again seen.       Impression:         1. Distal small bowel obstruction with a relative transition zone in the  right lower abdomen. No focal obstructing lesion is identified.  2. Trace amount of free fluid in the abdomen and pelvis, probably  reactive.  3. Colonic diverticulosis without diverticulitis or colitis.  Appendectomy.  4. Additional nonacute findings as above.           Electronically Signed By-Dr. Ed Ray MD On:3/22/2024 12:12 AM       XR Chest 1 View [[id number]] Collected: 03/21/24 1906     Updated: 03/21/24 1909    Narrative:      XR CHEST 1 VW-     Date of Exam: 3/21/2024 6:55 PM     Indication: runny nose/abd pain     Comparison: None available.     Findings:  Heart size within normal limits. No infectious appearing consolidation,  edema, effusion or pneumothorax. Probable minimal platelike opacities in  the lung bases suggesting atelectasis versus scar. Aortic  atherosclerotic disease. Degenerative related osseous changes.       Impression:      Impression:  No active pulmonary process.        Electronically Signed By-Johnny Peña MD On:3/21/2024 7:07 PM                      Assessment & Plan   Assessment / Plan     Assessment/Plan:   Small bowel obstruction  Bilateral lower extremity edema, concern for CHF   Hyperlipidemia  BPH    Plan  Admit to observation, remote telemetry  N.p.o.  Insert NG tube  Connect NG tube to low intermittent suction  Pain control with IV analgesics  General surgery consult in a.m. Dr. Naidu  Noted to have significant bilateral lower extremity edema.  Patient states that he has been having this since quite a long time.  Follow-up on proBNP  levels.  No previous cardiac echo on file.  Ordered cardiac echo for further evaluation for heart failure.  Trace protein on urinalysis.  Monitor electrolytes, renal function, hemoglobin, platelets with a.m. labs    DVT prophylaxis:  Medical DVT prophylaxis orders are signed and held.        CODE STATUS:    Level Of Support Discussed With: Patient  Code Status (Patient has no pulse and is not breathing): CPR (Attempt to Resuscitate)  Medical Interventions (Patient has pulse or is breathing): Full Support      Admission Status:  I believe this patient meets observation status.    Part of this note may be an electronic transcription/translation of spoken language to printed text using the Dragon Dictation System    Manuela Moyer MD

## 2024-03-23 ENCOUNTER — APPOINTMENT (OUTPATIENT)
Dept: GENERAL RADIOLOGY | Facility: HOSPITAL | Age: 84
End: 2024-03-23
Payer: MEDICARE

## 2024-03-23 LAB
ANION GAP SERPL CALCULATED.3IONS-SCNC: 9.7 MMOL/L (ref 5–15)
BUN SERPL-MCNC: 11 MG/DL (ref 8–23)
BUN/CREAT SERPL: 13.8 (ref 7–25)
CALCIUM SPEC-SCNC: 8.7 MG/DL (ref 8.6–10.5)
CHLORIDE SERPL-SCNC: 103 MMOL/L (ref 98–107)
CO2 SERPL-SCNC: 26.3 MMOL/L (ref 22–29)
CREAT SERPL-MCNC: 0.8 MG/DL (ref 0.76–1.27)
DEPRECATED RDW RBC AUTO: 47.3 FL (ref 37–54)
EGFRCR SERPLBLD CKD-EPI 2021: 87.8 ML/MIN/1.73
ERYTHROCYTE [DISTWIDTH] IN BLOOD BY AUTOMATED COUNT: 12.8 % (ref 12.3–15.4)
GLUCOSE SERPL-MCNC: 96 MG/DL (ref 65–99)
HCT VFR BLD AUTO: 40.8 % (ref 37.5–51)
HGB BLD-MCNC: 12.8 G/DL (ref 13–17.7)
MAGNESIUM SERPL-MCNC: 1.6 MG/DL (ref 1.6–2.4)
MCH RBC QN AUTO: 31.4 PG (ref 26.6–33)
MCHC RBC AUTO-ENTMCNC: 31.4 G/DL (ref 31.5–35.7)
MCV RBC AUTO: 100.2 FL (ref 79–97)
PHOSPHATE SERPL-MCNC: 2.9 MG/DL (ref 2.5–4.5)
PLATELET # BLD AUTO: 172 10*3/MM3 (ref 140–450)
PMV BLD AUTO: 11.6 FL (ref 6–12)
POTASSIUM SERPL-SCNC: 4 MMOL/L (ref 3.5–5.2)
RBC # BLD AUTO: 4.07 10*6/MM3 (ref 4.14–5.8)
SODIUM SERPL-SCNC: 139 MMOL/L (ref 136–145)
WBC NRBC COR # BLD AUTO: 7.81 10*3/MM3 (ref 3.4–10.8)

## 2024-03-23 PROCEDURE — 99231 SBSQ HOSP IP/OBS SF/LOW 25: CPT | Performed by: SURGERY

## 2024-03-23 PROCEDURE — 83735 ASSAY OF MAGNESIUM: CPT | Performed by: SURGERY

## 2024-03-23 PROCEDURE — 0 DIATRIZOATE MEGLUMINE & SODIUM PER 1 ML: Performed by: SURGERY

## 2024-03-23 PROCEDURE — 74019 RADEX ABDOMEN 2 VIEWS: CPT

## 2024-03-23 PROCEDURE — 74018 RADEX ABDOMEN 1 VIEW: CPT

## 2024-03-23 PROCEDURE — 25010000002 HEPARIN (PORCINE) PER 1000 UNITS: Performed by: STUDENT IN AN ORGANIZED HEALTH CARE EDUCATION/TRAINING PROGRAM

## 2024-03-23 PROCEDURE — 25010000002 MORPHINE PER 10 MG: Performed by: STUDENT IN AN ORGANIZED HEALTH CARE EDUCATION/TRAINING PROGRAM

## 2024-03-23 PROCEDURE — 84100 ASSAY OF PHOSPHORUS: CPT | Performed by: SURGERY

## 2024-03-23 PROCEDURE — 80048 BASIC METABOLIC PNL TOTAL CA: CPT | Performed by: SURGERY

## 2024-03-23 PROCEDURE — 25810000003 SODIUM CHLORIDE 0.9 % SOLUTION: Performed by: INTERNAL MEDICINE

## 2024-03-23 PROCEDURE — 99232 SBSQ HOSP IP/OBS MODERATE 35: CPT | Performed by: INTERNAL MEDICINE

## 2024-03-23 PROCEDURE — 85027 COMPLETE CBC AUTOMATED: CPT | Performed by: INTERNAL MEDICINE

## 2024-03-23 RX ORDER — SODIUM CHLORIDE 9 MG/ML
75 INJECTION, SOLUTION INTRAVENOUS CONTINUOUS
Status: DISCONTINUED | OUTPATIENT
Start: 2024-03-23 | End: 2024-03-24

## 2024-03-23 RX ADMIN — DIATRIZOATE MEGLUMINE AND DIATRIZOATE SODIUM 150 ML: 660; 100 LIQUID ORAL; RECTAL at 12:07

## 2024-03-23 RX ADMIN — HEPARIN SODIUM 5000 UNITS: 5000 INJECTION INTRAVENOUS; SUBCUTANEOUS at 21:12

## 2024-03-23 RX ADMIN — HEPARIN SODIUM 5000 UNITS: 5000 INJECTION INTRAVENOUS; SUBCUTANEOUS at 05:22

## 2024-03-23 RX ADMIN — SODIUM CHLORIDE 75 ML/HR: 9 INJECTION, SOLUTION INTRAVENOUS at 07:34

## 2024-03-23 RX ADMIN — NYSTATIN: 100000 POWDER TOPICAL at 08:07

## 2024-03-23 RX ADMIN — SODIUM CHLORIDE 75 ML/HR: 9 INJECTION, SOLUTION INTRAVENOUS at 19:02

## 2024-03-23 RX ADMIN — Medication 10 ML: at 21:12

## 2024-03-23 RX ADMIN — MORPHINE SULFATE 1 MG: 2 INJECTION, SOLUTION INTRAMUSCULAR; INTRAVENOUS at 03:17

## 2024-03-23 RX ADMIN — HEPARIN SODIUM 5000 UNITS: 5000 INJECTION INTRAVENOUS; SUBCUTANEOUS at 13:19

## 2024-03-23 RX ADMIN — Medication 10 ML: at 08:08

## 2024-03-23 RX ADMIN — NYSTATIN: 100000 POWDER TOPICAL at 21:12

## 2024-03-23 NOTE — PLAN OF CARE
Goal Outcome Evaluation:  Plan of Care Reviewed With: patient  Progress: no change  Outcome Evaluation: VSS with slight hypertension, highest being 164/78, one hour follow up BP of 142/70. Pt has only intermittently spoken with staff. Pt did remove his NG tube around 0030 and told staff he took it out because he does not need it anymore. New NG tube placed. Pt also reported pain to staff and received a one time dose of PRN Morphine this morning. Pt appeared to rest well with even respirations between care.

## 2024-03-23 NOTE — PLAN OF CARE
Goal Outcome Evaluation:      Administered Gastrografin at 1200 and clamped NG tube. NG tube hooked back up to LIWS at 1625. To have Gastrografin Challenge performed at 2000. Tolerated suction well this shift with no complaints. Updated sister this shift via phone call.     Progress: no change

## 2024-03-23 NOTE — PROGRESS NOTES
[facility]   Hospitalist Progress Note  Date: 3/23/2024  Patient Name: Ascencion Murphy  : 1940  MRN: [id number]  Date of admission: 3/21/2024  Room/Bed: 3005/1      Subjective   Subjective     Chief Complaint: abdominal pain     Summary:Ascencion Murphy is a 83 y.o. male with a past medical history of BPH, hyperlipidemia presented to the ED for evaluation of diffuse abdominal pain that started yesterday.  Associated with nausea and vomiting x2, abdominal distention.  Not able to pass gas.  Last bowel movement 2 days ago.  Denies any hematemesis, hematochezia, melena.  States that abdominal pain has improved after the NG tube has been placed.  Denies any fevers, chills, chest pain, shortness of breath.     In the ED, temperature 97.8, pulse 74, respiratory 18, blood pressure 166/72 on room air saturating around 99% labs showed CBC, CMP with no significant findings, normal lactic acid, normal lipase, urinalysis is noninfectious.  COVID flu RSV negative.  Chest x-ray showed no acute abnormality.  CT abdomen pelvis with contrast showed distal small bowel obstruction with a relative transition zone in the right lower abdomen.  No focal obstructing lesion is identified.  Trace amount of free fluid in the abdominal pelvis probably reactive.  Colonic diverticulosis without diverticulitis or colitis.  Case has been discussed by the ED physician with general surgeon on-call Dr. Naidu, agreed to consult.  Patient has been admitted for further evaluation and management of small bowel obstruction.    Interval Followup:   Patient continues with n/g tube  He does not complain of any nausea, vomiting or abdominal pain  Vitals stable     Review of Systems    All systems reviewed and negative except for what is outlined above.      Objective   Objective     Vitals:   Temp:  [97.3 °F (36.3 °C)-99 °F (37.2 °C)] 99 °F (37.2 °C)  Heart Rate:  [51-61] 53  Resp:  [16-18] 18  BP: (125-164)/(62-84) 158/84    Physical Exam   General:  Awake, alert, NAD resting in bed with n/g in place   HENT: NCAT, MMM  Eyes: pupils equal, no scleral icterus  Cardiovascular: RRR, no murmurs   Pulmonary: CTA bilaterally; no wheezes; no conversational dyspnea  Gastrointestinal: S/ND/NT, +BS  Musculoskeletal: No gross deformities  Skin: No jaundice, no rash on exposed skin appreciated  Neuro: CN II through XII grossly intact; speech clear; no tremor  Psych: Mood and affect appropriate  : No Quinn catheter; no suprapubic tenderness    Result Review    Result Review:  I have personally reviewed these results:  [x]  Laboratory      Lab 03/23/24  0638 03/22/24  0609 03/21/24 1925   WBC 7.81 9.54 9.32   HEMOGLOBIN 12.8* 13.3 14.2   HEMATOCRIT 40.8 42.1 42.9   PLATELETS 172 199 208   NEUTROS ABS  --  7.68* 7.27*   IMMATURE GRANS (ABS)  --  0.04 0.07*   LYMPHS ABS  --  0.96 0.97   MONOS ABS  --  0.73 0.84   EOS ABS  --  0.10 0.13   .2* 99.8* 97.9*   LACTATE  --   --  1.7         Lab 03/23/24  0638 03/22/24  0609 03/21/24 1925   SODIUM 139 139 140   POTASSIUM 4.0 4.0 4.0   CHLORIDE 103 104 102   CO2 26.3 27.4 28.7   ANION GAP 9.7 7.6 9.3   BUN 11 12 15   CREATININE 0.80 0.78 0.91   EGFR 87.8 88.5 83.6   GLUCOSE 96 134* 163*   CALCIUM 8.7 9.0 9.5   MAGNESIUM 1.6 1.7 1.8   PHOSPHORUS 2.9 3.4 3.0         Lab 03/22/24  0609 03/21/24 1925   TOTAL PROTEIN 6.5 7.3   ALBUMIN 3.4* 3.9   GLOBULIN 3.1 3.4   ALT (SGPT) 8 12   AST (SGOT) 21 23   BILIRUBIN 0.8 0.7   ALK PHOS 88 91   LIPASE  --  15         Lab 03/21/24 1925   PROBNP 85.8                 Brief Urine Lab Results  (Last result in the past 365 days)        Color   Clarity   Blood   Leuk Est   Nitrite   Protein   CREAT   Urine HCG        03/21/24 1951 Dark Yellow   Clear   Negative   Negative   Negative   Trace                 [x]  Microbiology   Microbiology Results (last 10 days)       Procedure Component Value - Date/Time    COVID PRE-OP / PRE-PROCEDURE SCREENING ORDER (NO ISOLATION) - Swab, Nasopharynx  [[id number]]  (Normal) Collected: 03/21/24 1915    Lab Status: Final result Specimen: Swab from Nasopharynx Updated: 03/21/24 2004    Narrative:      The following orders were created for panel order COVID PRE-OP / PRE-PROCEDURE SCREENING ORDER (NO ISOLATION) - Swab, Nasopharynx.  Procedure                               Abnormality         Status                     ---------                               -----------         ------                     COVID-19, FLU A/B, RSV P...[[id number]]  Normal              Final result                 Please view results for these tests on the individual orders.    COVID-19, FLU A/B, RSV PCR 1 HR TAT - Swab, Nasopharynx [[id number]]  (Normal) Collected: 03/21/24 1915    Lab Status: Final result Specimen: Swab from Nasopharynx Updated: 03/21/24 2004     COVID19 Not Detected     Influenza A PCR Not Detected     Influenza B PCR Not Detected     RSV, PCR Not Detected    Narrative:      Fact sheet for providers: https://www.fda.gov/media/788081/download    Fact sheet for patients: https://www.fda.gov/media/171383/download    Test performed by PCR.          [x]  Radiology  XR Abdomen KUB    Result Date: 3/23/2024  Impression: The distal tip of the nasogastric (NG) tube is in the expected gastric lumen.   Electronically Signed By-Ihsan Lam MD On:3/23/2024 1:22 AM      XR Abdomen KUB    Result Date: 3/22/2024  Impression: NG tube in the proximal stomach as described. Consider advancing 5 to 10 cm.   Electronically Signed By-Dr. Ed Ray MD On:3/22/2024 1:21 AM      CT Abdomen Pelvis With Contrast    Result Date: 3/22/2024   1. Distal small bowel obstruction with a relative transition zone in the right lower abdomen. No focal obstructing lesion is identified. 2. Trace amount of free fluid in the abdomen and pelvis, probably reactive. 3. Colonic diverticulosis without diverticulitis or colitis. Appendectomy. 4. Additional nonacute findings as above.    Electronically Signed  By-Dr. Ed Ray MD On:3/22/2024 12:12 AM      XR Chest 1 View    Result Date: 3/21/2024  Impression: No active pulmonary process.   Electronically Signed By-Johnny Peña MD On:3/21/2024 7:07 PM     []  EKG/Telemetry   []  Cardiology/Vascular   []  Pathology  []  Old records  []  Other:    Assessment & Plan   Assessment / Plan     Assessment:  Small bowel obstruction  Bilateral lower extremity edema, concern for CHF   Hyperlipidemia  BPH    Plan:  Surgery following. Continue n/g and plan for gastrografin challenge today  Continue IV fluids  Echo appears fine  Resume home meds when able  Repeat labs in am        Discussed with RN.    DVT prophylaxis:  Medical DVT prophylaxis orders are present.        CODE STATUS:   Level Of Support Discussed With: Patient  Code Status (Patient has no pulse and is not breathing): CPR (Attempt to Resuscitate)  Medical Interventions (Patient has pulse or is breathing): Full Support      Electronically signed by Patrice Contreras DO, 3/23/2024, 10:37 EDT.

## 2024-03-24 LAB
ANION GAP SERPL CALCULATED.3IONS-SCNC: 12.2 MMOL/L (ref 5–15)
BUN SERPL-MCNC: 15 MG/DL (ref 8–23)
BUN/CREAT SERPL: 19.7 (ref 7–25)
CALCIUM SPEC-SCNC: 8.7 MG/DL (ref 8.6–10.5)
CHLORIDE SERPL-SCNC: 103 MMOL/L (ref 98–107)
CO2 SERPL-SCNC: 24.8 MMOL/L (ref 22–29)
CREAT SERPL-MCNC: 0.76 MG/DL (ref 0.76–1.27)
DEPRECATED RDW RBC AUTO: 46.3 FL (ref 37–54)
EGFRCR SERPLBLD CKD-EPI 2021: 89.2 ML/MIN/1.73
ERYTHROCYTE [DISTWIDTH] IN BLOOD BY AUTOMATED COUNT: 12.5 % (ref 12.3–15.4)
GLUCOSE SERPL-MCNC: 86 MG/DL (ref 65–99)
HCT VFR BLD AUTO: 40.8 % (ref 37.5–51)
HGB BLD-MCNC: 12.9 G/DL (ref 13–17.7)
MAGNESIUM SERPL-MCNC: 1.7 MG/DL (ref 1.6–2.4)
MCH RBC QN AUTO: 31.4 PG (ref 26.6–33)
MCHC RBC AUTO-ENTMCNC: 31.6 G/DL (ref 31.5–35.7)
MCV RBC AUTO: 99.3 FL (ref 79–97)
PHOSPHATE SERPL-MCNC: 3.4 MG/DL (ref 2.5–4.5)
PLATELET # BLD AUTO: 184 10*3/MM3 (ref 140–450)
PMV BLD AUTO: 12 FL (ref 6–12)
POTASSIUM SERPL-SCNC: 3.8 MMOL/L (ref 3.5–5.2)
RBC # BLD AUTO: 4.11 10*6/MM3 (ref 4.14–5.8)
SODIUM SERPL-SCNC: 140 MMOL/L (ref 136–145)
WBC NRBC COR # BLD AUTO: 8.84 10*3/MM3 (ref 3.4–10.8)

## 2024-03-24 PROCEDURE — 84100 ASSAY OF PHOSPHORUS: CPT | Performed by: SURGERY

## 2024-03-24 PROCEDURE — 99232 SBSQ HOSP IP/OBS MODERATE 35: CPT | Performed by: INTERNAL MEDICINE

## 2024-03-24 PROCEDURE — 80048 BASIC METABOLIC PNL TOTAL CA: CPT | Performed by: SURGERY

## 2024-03-24 PROCEDURE — 99231 SBSQ HOSP IP/OBS SF/LOW 25: CPT | Performed by: SURGERY

## 2024-03-24 PROCEDURE — 97165 OT EVAL LOW COMPLEX 30 MIN: CPT

## 2024-03-24 PROCEDURE — 97161 PT EVAL LOW COMPLEX 20 MIN: CPT

## 2024-03-24 PROCEDURE — 83735 ASSAY OF MAGNESIUM: CPT | Performed by: SURGERY

## 2024-03-24 PROCEDURE — 25010000002 HEPARIN (PORCINE) PER 1000 UNITS: Performed by: STUDENT IN AN ORGANIZED HEALTH CARE EDUCATION/TRAINING PROGRAM

## 2024-03-24 PROCEDURE — 25810000003 SODIUM CHLORIDE 0.9 % SOLUTION: Performed by: INTERNAL MEDICINE

## 2024-03-24 PROCEDURE — 85027 COMPLETE CBC AUTOMATED: CPT | Performed by: INTERNAL MEDICINE

## 2024-03-24 RX ORDER — ASPIRIN 81 MG/1
81 TABLET ORAL DAILY
Status: DISCONTINUED | OUTPATIENT
Start: 2024-03-25 | End: 2024-03-26 | Stop reason: HOSPADM

## 2024-03-24 RX ORDER — TAMSULOSIN HYDROCHLORIDE 0.4 MG/1
0.4 CAPSULE ORAL NIGHTLY
Status: DISCONTINUED | OUTPATIENT
Start: 2024-03-24 | End: 2024-03-26 | Stop reason: HOSPADM

## 2024-03-24 RX ORDER — FINASTERIDE 5 MG/1
5 TABLET, FILM COATED ORAL NIGHTLY
Status: DISCONTINUED | OUTPATIENT
Start: 2024-03-24 | End: 2024-03-26 | Stop reason: HOSPADM

## 2024-03-24 RX ORDER — ACETAMINOPHEN 500 MG
1000 TABLET ORAL EVERY 6 HOURS PRN
Status: DISCONTINUED | OUTPATIENT
Start: 2024-03-24 | End: 2024-03-26 | Stop reason: HOSPADM

## 2024-03-24 RX ORDER — HYDROCODONE BITARTRATE AND ACETAMINOPHEN 5; 325 MG/1; MG/1
1 TABLET ORAL EVERY 8 HOURS PRN
Status: DISCONTINUED | OUTPATIENT
Start: 2024-03-24 | End: 2024-03-26 | Stop reason: HOSPADM

## 2024-03-24 RX ADMIN — SODIUM CHLORIDE 75 ML/HR: 9 INJECTION, SOLUTION INTRAVENOUS at 14:08

## 2024-03-24 RX ADMIN — HEPARIN SODIUM 5000 UNITS: 5000 INJECTION INTRAVENOUS; SUBCUTANEOUS at 05:49

## 2024-03-24 RX ADMIN — NYSTATIN: 100000 POWDER TOPICAL at 22:39

## 2024-03-24 RX ADMIN — NYSTATIN: 100000 POWDER TOPICAL at 08:01

## 2024-03-24 RX ADMIN — HEPARIN SODIUM 5000 UNITS: 5000 INJECTION INTRAVENOUS; SUBCUTANEOUS at 22:38

## 2024-03-24 RX ADMIN — HEPARIN SODIUM 5000 UNITS: 5000 INJECTION INTRAVENOUS; SUBCUTANEOUS at 14:21

## 2024-03-24 RX ADMIN — Medication 10 ML: at 08:00

## 2024-03-24 NOTE — PLAN OF CARE
Goal Outcome Evaluation:  Plan of Care Reviewed With: patient, family        Progress: no change  Outcome Evaluation: Patient presents with balance, endurance and cognitive limitations that impede his/her ability to perform ADLS. The skills of a therapist are necessary to maximize independence with ADLs.      Anticipated Discharge Disposition (OT): sub acute care setting

## 2024-03-24 NOTE — PLAN OF CARE
Goal Outcome Evaluation:  Plan of Care Reviewed With: patient    Progress: no change  Outcome Evaluation: VSS with slight hypertension, highest being 156/71. Pt completed Gastrografin challenge at the beginning of the shift. Pt has been more alert and conversive with staff today. Pt had two large bowel movements this shift and is requesting oral intake at this time. Pt removed his NG tube and IV around 0500. Per Zita MARCELINO, NG tube was left out and dayshift RN was notified to follow up with primary this morning to assess if NG tube needs to be placed again.

## 2024-03-24 NOTE — PLAN OF CARE
Goal Outcome Evaluation:  Plan of Care Reviewed With: patient        Progress: no change  Outcome Evaluation: Pt presents with limitations that impede their ability to safely and independently transfer and ambulate. The skills of a therapist will be required to safely and effectively implement the following treatment plan to restore maximal level of function.      Anticipated Discharge Disposition (PT): sub acute care setting

## 2024-03-24 NOTE — THERAPY EVALUATION
Acute Care - Physical Therapy Initial Evaluation   [facility]     Patient Name: Ascencion Murphy  : 1940  MRN: [id number]  Today's Date: 3/24/2024      Visit Dx:     ICD-10-CM ICD-9-CM   1. Small bowel obstruction  K56.609 560.9   2. Generalized abdominal pain  R10.84 789.07   3. Nausea and vomiting, unspecified vomiting type  R11.2 787.01   4. Impaired mobility and ADLs  Z74.09 V49.89    Z78.9    5. Difficulty walking  R26.2 719.7     Patient Active Problem List   Diagnosis    SBO (small bowel obstruction)     Past Medical History:   Diagnosis Date    Cancer     skin Cancer    Enlarged prostate     hx obtained from info sheet from [facility]    Hyperlipidemia     Hypertension     from  visit office note of      Past Surgical History:   Procedure Laterality Date    APPENDECTOMY      COLON SURGERY      part of bowel removed    COLONOSCOPY      in [place]    EYE SURGERY      cataract surgery     PT Assessment (Last 12 Hours)       PT Evaluation and Treatment       Row Name 24 1200          Physical Therapy Time and Intention    Subjective Information complains of;fatigue  -CS     Document Type evaluation  -CS     Mode of Treatment individual therapy;physical therapy  -CS     Patient Effort fair  -CS     Symptoms Noted During/After Treatment fatigue  -CS       Row Name 24 1200          General Information    Patient Profile Reviewed yes  -CS     Patient Observations lethargic  -CS     Prior Level of Function independent:;gait;transfer;bed mobility;min assist:;ADL's  Per TERRY, pt is at an [facility] in memory care unit where he is mostly independent with a rolling walker and staff assists as needed with ADLs.  -CS     Equipment Currently Used at Home shower chair;power chair, (recliner lift);walker, rolling  Pt sleeps in lift chair/recliner  -CS     Existing Precautions/Restrictions fall  -CS     Barriers to Rehab none identified  -CS       Row Name 24 1200          Living Environment     Current Living Arrangements assisted living facility  -CS     People in Home other (see comments)  residential  -CS     Primary Care Provided by other (see comments);self  staff at facility  -       Row Name 03/24/24 1200          Pain    Pretreatment Pain Rating 0/10 - no pain  -CS     Posttreatment Pain Rating 0/10 - no pain  -       Row Name 03/24/24 1200          Cognition    Affect/Mental Status (Cognition) confused;low arousal/lethargic  -     Orientation Status (Cognition) oriented to;person  -CS     Follows Commands (Cognition) delayed response/completion;increased processing time needed;repetition of directions required;verbal cues/prompting required  -       Row Name 03/24/24 1200          Range of Motion Comprehensive    General Range of Motion bilateral lower extremity ROM WFL  with AAROM only  -       Row Name 03/24/24 1200          Strength Comprehensive (MMT)    General Manual Muscle Testing (MMT) Assessment lower extremity strength deficits identified  -     Comment, General Manual Muscle Testing (MMT) Assessment BLEs assessed at 3-/5  -       Row Name 03/24/24 1200          Vision Assessment/Intervention    Visual Impairment/Limitations other (see comments)  patient with dimmed glasses/sunglasses due to bifocals he needs in them  -       Row Name 03/24/24 1200          Bed Mobility    Bed Mobility bed mobility (all) activities  -     All Activities, [place] (Bed Mobility) moderate assist (50% patient effort);maximum assist (25% patient effort)  -     Bed Mobility, Safety Issues cognitive deficits limit understanding;decreased use of arms for pushing/pulling;decreased use of legs for bridging/pushing  -     Assistive Device (Bed Mobility) bed rails;draw sheet;head of bed elevated  -     Comment, (Bed Mobility) Pt refused further transfers and was having a difficult time staying awake during evluation. Not safe to attempt OOB transfers or ambulation  -       Row Name 03/24/24  1200          Safety Issues, Functional Mobility    Safety Issues Affecting Function (Mobility) ability to follow commands  -CS     Impairments Affecting Function (Mobility) balance;cognition;endurance/activity tolerance;strength  -CS       Row Name 03/24/24 1200          Balance    Balance Assessment sitting static balance  -CS     Static Sitting Balance verbal cues;contact guard;minimal assist  -CS     Position, Sitting Balance sitting edge of bed  -CS       Row Name             Wound 03/22/24 0400 Right anterior groin Other (comment)    Wound - Properties Group Placement Date: 03/22/24  -MP, 0400  Placement Time: 0400  -MP Side: Right  -MP Orientation: anterior  -MP Location: groin  -MP Primary Wound Type: Other  -MP, yeasty     Retired Wound - Properties Group Placement Date: 03/22/24  -MP, 0400  Placement Time: 0400  -MP Side: Right  -MP Orientation: anterior  -MP Location: groin  -MP Primary Wound Type: Other  -MP, yeasty     Retired Wound - Properties Group Date first assessed: 03/22/24  -MP, 0400  Time first assessed: 0400  -MP Side: Right  -MP Location: groin  -MP Primary Wound Type: Other  -MP, yeasty       Row Name             Wound 03/22/24 0400 Left anterior groin Other (comment)    Wound - Properties Group Placement Date: 03/22/24  -MP Placement Time: 0400  -MP Present on Original Admission: Y  -MP Side: Left  -MP Orientation: anterior  -MP Location: groin  -MP Primary Wound Type: Other  -MP, yeast rash     Retired Wound - Properties Group Placement Date: 03/22/24  -MP Placement Time: 0400  -MP Present on Original Admission: Y  -MP Side: Left  -MP Orientation: anterior  -MP Location: groin  -MP Primary Wound Type: Other  -MP, yeast rash     Retired Wound - Properties Group Date first assessed: 03/22/24  -MP Time first assessed: 0400  -MP Present on Original Admission: Y  -MP Side: Left  -MP Location: groin  -MP Primary Wound Type: Other  -MP, yeast rash       Row Name             Wound 03/22/24 0400  Bilateral gluteal Other (comment)    Wound - Properties Group Placement Date: 03/22/24  -MP Placement Time: 0400  -MP Present on Original Admission: Y  -MP Side: Bilateral  -MP Location: gluteal  -MP Primary Wound Type: Other  -MP, redness blanchable     Retired Wound - Properties Group Placement Date: 03/22/24  -MP Placement Time: 0400  -MP Present on Original Admission: Y  -MP Side: Bilateral  -MP Location: gluteal  -MP Primary Wound Type: Other  -MP, redness blanchable     Retired Wound - Properties Group Date first assessed: 03/22/24  -MP Time first assessed: 0400  -MP Present on Original Admission: Y  -MP Side: Bilateral  -MP Location: gluteal  -MP Primary Wound Type: Other  -MP, redness blanchable       Row Name 03/24/24 1200          Plan of Care Review    Plan of Care Reviewed With patient  -CS     Progress no change  -CS     Outcome Evaluation Pt presents with limitations that impede their ability to safely and independently transfer and ambulate. The skills of a therapist will be required to safely and effectively implement the following treatment plan to restore maximal level of function.  -CS       Row Name 03/24/24 1200          Positioning and Restraints    Pre-Treatment Position in bed  -CS     Post Treatment Position bed  -CS     In Bed fowlers;call light within reach;encouraged to call for assist;exit alarm on  -CS       Row Name 03/24/24 1200          Therapy Assessment/Plan (PT)    Rehab Potential (PT) fair, will monitor progress closely  -CS     Criteria for Skilled Interventions Met (PT) yes;skilled treatment is necessary  -CS     Therapy Frequency (PT) daily  -CS     Predicted Duration of Therapy Intervention (PT) 10 days  -CS     Problem List (PT) problems related to;balance;mobility;strength;pain;cognition  -CS     Activity Limitations Related to Problem List (PT) unable to ambulate safely;unable to transfer safely  -CS       Row Name 03/24/24 1200          PT Evaluation Complexity     History, PT Evaluation Complexity 1-2 personal factors and/or comorbidities  -CS     Examination of Body Systems (PT Eval Complexity) total of 4 or more elements  -CS     Clinical Presentation (PT Evaluation Complexity) stable  -CS     Clinical Decision Making (PT Evaluation Complexity) low complexity  -CS     Overall Complexity (PT Evaluation Complexity) low complexity  -CS       Row Name 03/24/24 1200          Therapy Plan Review/Discharge Plan (PT)    Therapy Plan Review (PT) evaluation/treatment results reviewed;patient  -CS       Row Name 03/24/24 1200          Physical Therapy Goals    Bed Mobility Goal Selection (PT) bed mobility, PT goal 1  -CS     Transfer Goal Selection (PT) transfer, PT goal 1  -CS     Gait Training Goal Selection (PT) gait training, PT goal 1  -CS       Row Name 03/24/24 1200          Bed Mobility Goal 1 (PT)    Activity/Assistive Device (Bed Mobility Goal 1, PT) bed mobility activities, all  -CS     [place] Level/Cues Needed (Bed Mobility Goal 1, PT) supervision required  -CS     Time Frame (Bed Mobility Goal 1, PT) long term goal (LTG);10 days  -CS       Row Name 03/24/24 1200          Transfer Goal 1 (PT)    Activity/Assistive Device (Transfer Goal 1, PT) sit-to-stand/stand-to-sit;bed-to-chair/chair-to-bed;walker, rolling  -CS     [place] Level/Cues Needed (Transfer Goal 1, PT) standby assist  -CS     Time Frame (Transfer Goal 1, PT) long term goal (LTG);10 days  -CS       Row Name 03/24/24 1200          Gait Training Goal 1 (PT)    Activity/Assistive Device (Gait Training Goal 1, PT) gait (walking locomotion);assistive device use;walker, rolling  -CS     [place] Level (Gait Training Goal 1, PT) contact guard required  -CS     Distance (Gait Training Goal 1, PT) 100  -CS     Time Frame (Gait Training Goal 1, PT) long term goal (LTG);10 days  -CS               User Key  (r) = Recorded By, (t) = Taken By, (c) = Cosigned By      Initials Name Provider Type    CITLALI Pantoja  RAMILA Ferreira Physical Therapist    Anyi Parish RN Registered Nurse                      PT Recommendation and Plan  Anticipated Discharge Disposition (PT): sub acute care setting  Planned Therapy Interventions (PT): balance training, bed mobility training, gait training, transfer training, strengthening  Therapy Frequency (PT): daily  Plan of Care Reviewed With: patient  Progress: no change  Outcome Evaluation: Pt presents with limitations that impede their ability to safely and independently transfer and ambulate. The skills of a therapist will be required to safely and effectively implement the following treatment plan to restore maximal level of function.   Outcome Measures       Row Name 03/24/24 1159             How much help from another person do you currently need...    Turning from your back to your side while in flat bed without using bedrails? 2  -CS      Moving from lying on back to sitting on the side of a flat bed without bedrails? 2  -CS      Moving to and from a bed to a chair (including a wheelchair)? 2  -CS      Standing up from a chair using your arms (e.g., wheelchair, bedside chair)? 2  -CS      Climbing 3-5 steps with a railing? 1  -CS      To walk in hospital room? 2  -CS      AM-PAC 6 Clicks Score (PT) 11  -CS      Highest Level of Mobility Goal 4 --> Transfer to chair/commode  -CS         Functional Assessment    Outcome Measure Options AM-PAC 6 Clicks Basic Mobility (PT)  -CS                User Key  (r) = Recorded By, (t) = Taken By, (c) = Cosigned By      Initials Name Provider Type    Hanna Javed PT Physical Therapist                     Time Calculation:    PT Charges       Row Name 03/24/24 1201             Time Calculation    PT Received On 03/24/24  -CS      PT Goal Re-Cert Due Date 04/02/24  -CS         Untimed Charges    PT Eval/Re-eval Minutes 25  -CS         Total Minutes    Untimed Charges Total Minutes 25  -CS       Total Minutes 25  -CS                User Key   (r) = Recorded By, (t) = Taken By, (c) = Cosigned By      Initials Name Provider Type    CS Hanna Pantoja, PT Physical Therapist                  Therapy Charges for Today       Code Description Service Date Service Provider Modifiers Qty    17149103106 HC PT EVAL LOW COMPLEXITY 2 3/24/2024 Hanna Pantoja PT GP 1            PT G-Codes  Outcome Measure Options: AM-PAC 6 Clicks Basic Mobility (PT)  AM-PAC 6 Clicks Score (PT): 11  AM-PAC 6 Clicks Score (OT): 14    Hanna Pantoja PT  3/24/2024

## 2024-03-24 NOTE — PROGRESS NOTES
[facility]   Hospitalist Progress Note  Date: 3/24/2024  Patient Name: Ascencion Murphy  : 1940  MRN: [id number]  Date of admission: 3/21/2024  Room/Bed: 3005/1      Subjective   Subjective     Chief Complaint: abdominal pain     Summary:Ascencion Murphy is a 83 y.o. male with a past medical history of BPH, hyperlipidemia presented to the ED for evaluation of diffuse abdominal pain that started yesterday.  Associated with nausea and vomiting x2, abdominal distention.  Not able to pass gas.  Last bowel movement 2 days ago.  Denies any hematemesis, hematochezia, melena.  States that abdominal pain has improved after the NG tube has been placed.  Denies any fevers, chills, chest pain, shortness of breath.     In the ED, temperature 97.8, pulse 74, respiratory 18, blood pressure 166/72 on room air saturating around 99% labs showed CBC, CMP with no significant findings, normal lactic acid, normal lipase, urinalysis is noninfectious.  COVID flu RSV negative.  Chest x-ray showed no acute abnormality.  CT abdomen pelvis with contrast showed distal small bowel obstruction with a relative transition zone in the right lower abdomen.  No focal obstructing lesion is identified.  Trace amount of free fluid in the abdominal pelvis probably reactive.  Colonic diverticulosis without diverticulitis or colitis.  Case has been discussed by the ED physician with general surgeon on-call Dr. Naidu, agreed to consult.  Patient has been admitted for further evaluation and management of small bowel obstruction.    Interval Followup:   N/g removed, diet advanced   He does not complain of any nausea, vomiting or abdominal pain  Vitals stable     Review of Systems    All systems reviewed and negative except for what is outlined above.      Objective   Objective     Vitals:   Temp:  [98.1 °F (36.7 °C)-99 °F (37.2 °C)] 98.1 °F (36.7 °C)  Heart Rate:  [50-67] 55  Resp:  [18] 18  BP: (131-156)/(65-92) 132/69    Physical Exam   General: Awake,  alert, NAD resting in bed   HENT: NCAT, MMM  Eyes: pupils equal, no scleral icterus  Cardiovascular: RRR, no murmurs   Pulmonary: CTA bilaterally; no wheezes; no conversational dyspnea  Gastrointestinal: S/ND/NT, +BS  Musculoskeletal: No gross deformities  Skin: No jaundice, no rash on exposed skin appreciated  Neuro: CN II through XII grossly intact; speech clear; no tremor  Psych: Mood and affect appropriate  : No Quinn catheter; no suprapubic tenderness    Result Review    Result Review:  I have personally reviewed these results:  [x]  Laboratory      Lab 03/24/24  0523 03/23/24  0638 03/22/24  0609 03/21/24 1925   WBC 8.84 7.81 9.54 9.32   HEMOGLOBIN 12.9* 12.8* 13.3 14.2   HEMATOCRIT 40.8 40.8 42.1 42.9   PLATELETS 184 172 199 208   NEUTROS ABS  --   --  7.68* 7.27*   IMMATURE GRANS (ABS)  --   --  0.04 0.07*   LYMPHS ABS  --   --  0.96 0.97   MONOS ABS  --   --  0.73 0.84   EOS ABS  --   --  0.10 0.13   MCV 99.3* 100.2* 99.8* 97.9*   LACTATE  --   --   --  1.7         Lab 03/24/24  0523 03/23/24  0638 03/22/24  0609   SODIUM 140 139 139   POTASSIUM 3.8 4.0 4.0   CHLORIDE 103 103 104   CO2 24.8 26.3 27.4   ANION GAP 12.2 9.7 7.6   BUN 15 11 12   CREATININE 0.76 0.80 0.78   EGFR 89.2 87.8 88.5   GLUCOSE 86 96 134*   CALCIUM 8.7 8.7 9.0   MAGNESIUM 1.7 1.6 1.7   PHOSPHORUS 3.4 2.9 3.4         Lab 03/22/24  0609 03/21/24 1925   TOTAL PROTEIN 6.5 7.3   ALBUMIN 3.4* 3.9   GLOBULIN 3.1 3.4   ALT (SGPT) 8 12   AST (SGOT) 21 23   BILIRUBIN 0.8 0.7   ALK PHOS 88 91   LIPASE  --  15         Lab 03/21/24 1925   PROBNP 85.8                 Brief Urine Lab Results  (Last result in the past 365 days)        Color   Clarity   Blood   Leuk Est   Nitrite   Protein   CREAT   Urine HCG        03/21/24 1951 Dark Yellow   Clear   Negative   Negative   Negative   Trace                 [x]  Microbiology   Microbiology Results (last 10 days)       Procedure Component Value - Date/Time    COVID PRE-OP / PRE-PROCEDURE SCREENING  ORDER (NO ISOLATION) - Swab, Nasopharynx [[id number]]  (Normal) Collected: 03/21/24 1915    Lab Status: Final result Specimen: Swab from Nasopharynx Updated: 03/21/24 2004    Narrative:      The following orders were created for panel order COVID PRE-OP / PRE-PROCEDURE SCREENING ORDER (NO ISOLATION) - Swab, Nasopharynx.  Procedure                               Abnormality         Status                     ---------                               -----------         ------                     COVID-19, FLU A/B, RSV P...[[id number]]  Normal              Final result                 Please view results for these tests on the individual orders.    COVID-19, FLU A/B, RSV PCR 1 HR TAT - Swab, Nasopharynx [[id number]]  (Normal) Collected: 03/21/24 1915    Lab Status: Final result Specimen: Swab from Nasopharynx Updated: 03/21/24 2004     COVID19 Not Detected     Influenza A PCR Not Detected     Influenza B PCR Not Detected     RSV, PCR Not Detected    Narrative:      Fact sheet for providers: https://www.fda.gov/media/728076/download    Fact sheet for patients: https://www.fda.gov/media/375766/download    Test performed by PCR.          [x]  Radiology  XR Abdomen Flat & Upright    Result Date: 3/23/2024  Impression: Oral contrast agent progresses to the colon, including the rectum, which is against a complete mechanical small bowel obstruction. Nasogastric tube remains in place.   Electronically Signed By-Ihsan Lam MD On:3/23/2024 10:25 PM      XR Abdomen KUB    Result Date: 3/23/2024  Impression: The distal tip of the nasogastric (NG) tube is in the expected gastric lumen.   Electronically Signed By-Ihsan Lam MD On:3/23/2024 1:22 AM      XR Abdomen KUB    Result Date: 3/22/2024  Impression: NG tube in the proximal stomach as described. Consider advancing 5 to 10 cm.   Electronically Signed By-Dr. Ed Ray MD On:3/22/2024 1:21 AM      CT Abdomen Pelvis With Contrast    Result Date: 3/22/2024   1. Distal small  bowel obstruction with a relative transition zone in the right lower abdomen. No focal obstructing lesion is identified. 2. Trace amount of free fluid in the abdomen and pelvis, probably reactive. 3. Colonic diverticulosis without diverticulitis or colitis. Appendectomy. 4. Additional nonacute findings as above.    Electronically Signed By-Dr. Ed Ray MD On:3/22/2024 12:12 AM      XR Chest 1 View    Result Date: 3/21/2024  Impression: No active pulmonary process.   Electronically Signed By-Johnny Peña MD On:3/21/2024 7:07 PM     []  EKG/Telemetry   []  Cardiology/Vascular   []  Pathology  []  Old records  []  Other:    Assessment & Plan   Assessment / Plan     Assessment:  Small bowel obstruction  Bilateral lower extremity edema, concern for CHF   Hyperlipidemia  BPH    Plan:  Surgery following. gastrografin challenge was normal   Diet advanced and n/g removed   Stop IV fluids   Echo appears fine  Resume home meds when able  Repeat labs in am        Discussed with RN.    DVT prophylaxis:  Medical DVT prophylaxis orders are present.        CODE STATUS:   Level Of Support Discussed With: Patient  Code Status (Patient has no pulse and is not breathing): CPR (Attempt to Resuscitate)  Medical Interventions (Patient has pulse or is breathing): Full Support      Electronically signed by Patrice Contreras DO, 3/24/2024, 10:17 EDT.

## 2024-03-24 NOTE — THERAPY EVALUATION
Patient Name: Ascencion Murphy  : 1940    MRN: [id number]                              Today's Date: 3/24/2024       Admit Date: 3/21/2024    Visit Dx:     ICD-10-CM ICD-9-CM   1. Small bowel obstruction  K56.609 560.9   2. Generalized abdominal pain  R10.84 789.07   3. Nausea and vomiting, unspecified vomiting type  R11.2 787.01   4. Impaired mobility and ADLs  Z74.09 V49.89    Z78.9      Patient Active Problem List   Diagnosis    SBO (small bowel obstruction)     Past Medical History:   Diagnosis Date    Cancer     skin Cancer    Enlarged prostate     hx obtained from info sheet from [employer]    Hyperlipidemia     Hypertension     from  visit office note of      Past Surgical History:   Procedure Laterality Date    APPENDECTOMY      COLON SURGERY      part of bowel removed    COLONOSCOPY      in [place]    EYE SURGERY      cataract surgery      General Information       Row Name 24 1108          OT Time and Intention    Document Type evaluation  -AC     Mode of Treatment individual therapy;occupational therapy  -AC       Row Name 24 1108          General Information    Patient Profile Reviewed yes  -AC     Prior Level of Function --  [place] reports say he lives in [facility]. patient uses a walker for functional mobility and sleeps in a lift recliner.  -AC     Existing Precautions/Restrictions fall  -AC     Barriers to Rehab none identified  -AC       Row Name 24 1108          Occupational Profile    Reason for Services/Referral (Occupational Profile) Pt. is a 83year old male admitted for the above diagnosis. Pt. referred to OT services to assess independence with ADLs and adl transfers/fx'l mobility. No previous OT services for current condition.  -AC       Row Name 24 1108          Living Environment    People in Home other (see comments)  [facility]  -AC       Row Name 24 1108          Cognition    Orientation Status  (Cognition) oriented to;person  -       Row Name 03/24/24 1108          Safety Issues, Functional Mobility    Safety Issues Affecting Function (Mobility) ability to follow commands;insight into deficits/self-awareness;judgment  -     Impairments Affecting Function (Mobility) balance;cognition;endurance/activity tolerance  -               User Key  (r) = Recorded By, (t) = Taken By, (c) = Cosigned By      Initials Name Provider Type     Marva Garcia OT Occupational Therapist                     Mobility/ADL's       Row Name 03/24/24 1114          Bed Mobility    Bed Mobility bed mobility (all) activities  -     All Activities, [place] (Bed Mobility) maximum assist (25% patient effort)  -     Bed Mobility, Safety Issues cognitive deficits limit understanding;decreased use of arms for pushing/pulling;decreased use of legs for bridging/pushing  -     Assistive Device (Bed Mobility) head of bed elevated;bed rails;draw sheet  -       Row Name 03/24/24 1114          Transfers    Transfers sit-stand transfer  -       Row Name 03/24/24 1114          Sit-Stand Transfer    Sit-Stand [place] (Transfers) minimum assist (75% patient effort);moderate assist (50% patient effort)  -     Assistive Device (Sit-Stand Transfers) walker, front-wheeled  -       Row Name 03/24/24 1114          Functional Mobility    Functional Mobility- Ind. Level verbal cues required;contact guard assist;1 person  -     Functional Mobility- Device walker, front-wheeled  -     Functional Mobility- Comment patient seated at EOB following fx'l mobility became agitated with visitor, not wanting to lay back down but had to be encouraged due to decreased safety and risk for falls.  -       Row Name 03/24/24 1114          Activities of Daily Living    BADL Assessment/Intervention --  patient is max/dep for lower body bathing/dressing, max/dep for toileting, max A for upper body bathing/dressing, mod A for grooming, mod A  for self-feeding  -               User Key  (r) = Recorded By, (t) = Taken By, (c) = Cosigned By      Initials Name Provider Type    AC Marva Garcia OT Occupational Therapist                   Obj/Interventions       [facility] Name 03/24/24 1118          Sensory Assessment (Somatosensory)    Sensory Assessment (Somatosensory) UE sensation intact  -AC       Row Name 03/24/24 1118          Vision Assessment/Intervention    Vision Assessment Comment patient stated his vision was fine, but left sunglasses on at all times and eyes mostly closed throuhout evaluation.  -[facility] Name 03/24/24 1118          Range of Motion Comprehensive    General Range of Motion bilateral upper extremity ROM WFL  -[facility] Name 03/24/24 1118          Strength Comprehensive (MMT)    General Manual Muscle Testing (MMT) Assessment no strength deficits identified  -AC       Row Name 03/24/24 1118          Motor Skills    Motor Skills coordination;functional endurance  -     [facility]  -AC       Row Name 03/24/24 1118          Balance    Balance Assessment standing dynamic balance  -     Dynamic Standing Balance minimal assist;contact guard  -     Position/Device Used, Standing Balance supported;walker, rolling  -               User Key  (r) = Recorded By, (t) = Taken By, (c) = Cosigned By      Initials Name Provider Type    AC Marva Garcia OT Occupational Therapist                   Goals/Plan       Row Name 03/24/24 1121          Transfer Goal 1 (OT)    Activity/Assistive Device (Transfer Goal 1, OT) transfers, all  -AC     [place] Level/Cues Needed (Transfer Goal 1, OT) modified independence  -AC     Time Frame (Transfer Goal 1, OT) long term goal (LTG);10 days  -AC       Row Name 03/24/24 1121          Bathing Goal 1 (OT)    Activity/Device (Bathing Goal 1, OT) bathing skills, all  -AC     [place] Level/Cues Needed (Bathing Goal 1, OT) minimum assist (75% or more patient effort)  -AC     Time Frame (Bathing  Goal 1, OT) long term goal (LTG);10 days  -AC       Row Name 03/24/24 1121          Dressing Goal 1 (OT)    Activity/Device (Dressing Goal 1, OT) dressing skills, all  -AC     [place]/Cues Needed (Dressing Goal 1, OT) minimum assist (75% or more patient effort)  -AC     Time Frame (Dressing Goal 1, OT) long term goal (LTG);10 days  -AC       Row Name 03/24/24 1121          Toileting Goal 1 (OT)    Activity/Device (Toileting Goal 1, OT) toileting skills, all  -AC     [place] Level/Cues Needed (Toileting Goal 1, OT) minimum assist (75% or more patient effort)  -AC     Time Frame (Toileting Goal 1, OT) long term goal (LTG);10 days  -AC       Row Name 03/24/24 1121          Problem Specific Goal 1 (OT)    Problem Specific Goal 1 (OT) Patient will demonstrate fair/fair plus activity tolerance for ADLs.  -AC     Time Frame (Problem Specific Goal 1, OT) long term goal (LTG);10 days  -AC       Row Name 03/24/24 1121          Therapy Assessment/Plan (OT)    Planned Therapy Interventions (OT) activity tolerance training;functional balance retraining;occupation/activity based interventions;ROM/therapeutic exercise;transfer/mobility retraining;patient/caregiver education/training;BADL retraining  -               User Key  (r) = Recorded By, (t) = Taken By, (c) = Cosigned By      Initials Name Provider Type    AC Marva Garcia OT Occupational Therapist                   Clinical Impression       Row Name 03/24/24 1119          Pain Assessment    Pretreatment Pain Rating 0/10 - no pain  -AC     Posttreatment Pain Rating 0/10 - no pain  -       Row Name 03/24/24 1119          Plan of Care Review    Plan of Care Reviewed With patient;family  -     Progress no change  -     Outcome Evaluation Patient presents with balance, endurance and cognitive limitations that impede his/her ability to perform ADLS. The skills of a therapist are necessary to maximize independence with ADLs.  -       Row Name 03/24/24 1112           Therapy Assessment/Plan (OT)    Patient/Family Therapy Goal Statement (OT) none stated  -AC     Rehab Potential (OT) good, to achieve stated therapy goals  -AC     Criteria for Skilled Therapeutic Interventions Met (OT) yes;meets criteria;skilled treatment is necessary  -AC     Therapy Frequency (OT) 5 times/wk  -       Row Name 03/24/24 1119          Therapy Plan Review/Discharge Plan (OT)    Equipment Needs Upon Discharge (OT) walker, rolling  -AC     Anticipated Discharge Disposition (OT) sub acute care setting  -       Row Name 03/24/24 1119          Positioning and Restraints    Pre-Treatment Position in bed  -AC     Post Treatment Position bed  -AC     In Bed fowlers;call light within reach;encouraged to call for assist;exit alarm on;with family/caregiver  -               User Key  (r) = Recorded By, (t) = Taken By, (c) = Cosigned By      Initials Name Provider Type    AC Marva Garcia, KARLI Occupational Therapist                   Outcome Measures       Row Name 03/24/24 1121          How much help from another is currently needed...    Putting on and taking off regular lower body clothing? 1  -AC     Bathing (including washing, rinsing, and drying) 2  -AC     Toileting (which includes using toilet bed pan or urinal) 2  -AC     Putting on and taking off regular upper body clothing 3  -AC     Taking care of personal grooming (such as brushing teeth) 3  -AC     Eating meals 3  -AC     AM-PAC 6 Clicks Score (OT) 14  -AC       Row Name 03/24/24 1121          Functional Assessment    Outcome Measure Options AM-PAC 6 Clicks Daily Activity (OT);Optimal Instrument  -AC       Row Name 03/24/24 1121          Optimal Instrument    Optimal Instrument Optimal - 3  -AC     Bending/Stooping 4  -AC     Standing 2  -AC     Reaching 2  -AC     From the list, choose the 3 activities you would most like to be able to do without any difficulty Bending/stooping;Standing;Reaching  -AC     Total Score Optimal - 3 8  -AC                User Key  (r) = Recorded By, (t) = Taken By, (c) = Cosigned By      Initials Name Provider Type     Marva Garcia OT Occupational Therapist                    Occupational Therapy Education       Title: PT OT SLP Therapies (Done)       Topic: Occupational Therapy (Done)       Point: ADL training (Done)       Description:   Instruct learner(s) on proper safety adaptation and remediation techniques during self care or transfers.   Instruct in proper use of assistive devices.                  Learning Progress Summary             Patient Acceptance, E, VU,NR by  at 3/24/2024 1122                         Point: Home exercise program (Done)       Description:   Instruct learner(s) on appropriate technique for monitoring, assisting and/or progressing therapeutic exercises/activities.                  Learning Progress Summary             Patient Acceptance, E, VU,NR by  at 3/24/2024 1122                         Point: Precautions (Done)       Description:   Instruct learner(s) on prescribed precautions during self-care and functional transfers.                  Learning Progress Summary             Patient Acceptance, E, VU,NR by  at 3/24/2024 1122                         Point: Body mechanics (Done)       Description:   Instruct learner(s) on proper positioning and spine alignment during self-care, functional mobility activities and/or exercises.                  Learning Progress Summary             Patient Acceptance, E, VU,NR by  at 3/24/2024 1122                                         User Key       Initials Effective Dates Name Provider Type Discipline     06/16/21 -  Marva Garcia OT Occupational Therapist OT                  OT Recommendation and Plan  Planned Therapy Interventions (OT): activity tolerance training, functional balance retraining, occupation/activity based interventions, ROM/therapeutic exercise, transfer/mobility retraining, patient/caregiver education/training, BADL  retraining  Therapy Frequency (OT): 5 times/wk  Plan of Care Review  Plan of Care Reviewed With: patient, family  Progress: no change  Outcome Evaluation: Patient presents with balance, endurance and cognitive limitations that impede his/her ability to perform ADLS. The skills of a therapist are necessary to maximize independence with ADLs.     Time Calculation:   Evaluation Complexity (OT)  Review Occupational Profile/Medical/Therapy History Complexity: brief/low complexity  Assessment, Occupational Performance/Identification of Deficit Complexity: 1-3 performance deficits  Clinical Decision Making Complexity (OT): problem focused assessment/low complexity  Overall Complexity of Evaluation (OT): low complexity     Time Calculation- OT       Row Name 03/24/24 1124             Time Calculation- OT    OT Received On 03/24/24  -AC      OT Goal Re-Cert Due Date 04/02/24  -AC         Untimed Charges    OT Eval/Re-eval Minutes 28  -AC         Total Minutes    Untimed Charges Total Minutes 28  -AC       Total Minutes 28  -AC                User Key  (r) = Recorded By, (t) = Taken By, (c) = Cosigned By      Initials Name Provider Type    AC Marva Garcia OT Occupational Therapist                  Therapy Charges for Today       Code Description Service Date Service Provider Modifiers Qty    [id number]  OT EVAL LOW COMPLEXITY 2 3/24/2024 Marva Garcia OT GO 1                 Marva Garcia OT  3/24/2024

## 2024-03-25 LAB
ANION GAP SERPL CALCULATED.3IONS-SCNC: 8.9 MMOL/L (ref 5–15)
BUN SERPL-MCNC: 16 MG/DL (ref 8–23)
BUN/CREAT SERPL: 21.3 (ref 7–25)
CALCIUM SPEC-SCNC: 8.6 MG/DL (ref 8.6–10.5)
CHLORIDE SERPL-SCNC: 102 MMOL/L (ref 98–107)
CO2 SERPL-SCNC: 24.1 MMOL/L (ref 22–29)
CREAT SERPL-MCNC: 0.75 MG/DL (ref 0.76–1.27)
DEPRECATED RDW RBC AUTO: 45.5 FL (ref 37–54)
EGFRCR SERPLBLD CKD-EPI 2021: 89.5 ML/MIN/1.73
ERYTHROCYTE [DISTWIDTH] IN BLOOD BY AUTOMATED COUNT: 12.7 % (ref 12.3–15.4)
GLUCOSE SERPL-MCNC: 94 MG/DL (ref 65–99)
HCT VFR BLD AUTO: 40.3 % (ref 37.5–51)
HGB BLD-MCNC: 13.2 G/DL (ref 13–17.7)
MAGNESIUM SERPL-MCNC: 1.9 MG/DL (ref 1.6–2.4)
MCH RBC QN AUTO: 32 PG (ref 26.6–33)
MCHC RBC AUTO-ENTMCNC: 32.8 G/DL (ref 31.5–35.7)
MCV RBC AUTO: 97.8 FL (ref 79–97)
PLATELET # BLD AUTO: 191 10*3/MM3 (ref 140–450)
PMV BLD AUTO: 11.8 FL (ref 6–12)
POTASSIUM SERPL-SCNC: 4.3 MMOL/L (ref 3.5–5.2)
RBC # BLD AUTO: 4.12 10*6/MM3 (ref 4.14–5.8)
SODIUM SERPL-SCNC: 135 MMOL/L (ref 136–145)
WBC NRBC COR # BLD AUTO: 8.06 10*3/MM3 (ref 3.4–10.8)

## 2024-03-25 PROCEDURE — 83735 ASSAY OF MAGNESIUM: CPT | Performed by: INTERNAL MEDICINE

## 2024-03-25 PROCEDURE — 85027 COMPLETE CBC AUTOMATED: CPT | Performed by: INTERNAL MEDICINE

## 2024-03-25 PROCEDURE — 25010000002 HEPARIN (PORCINE) PER 1000 UNITS: Performed by: STUDENT IN AN ORGANIZED HEALTH CARE EDUCATION/TRAINING PROGRAM

## 2024-03-25 PROCEDURE — 99231 SBSQ HOSP IP/OBS SF/LOW 25: CPT | Performed by: NURSE PRACTITIONER

## 2024-03-25 PROCEDURE — 80048 BASIC METABOLIC PNL TOTAL CA: CPT | Performed by: INTERNAL MEDICINE

## 2024-03-25 PROCEDURE — 99232 SBSQ HOSP IP/OBS MODERATE 35: CPT | Performed by: INTERNAL MEDICINE

## 2024-03-25 RX ADMIN — NYSTATIN: 100000 POWDER TOPICAL at 09:25

## 2024-03-25 RX ADMIN — HEPARIN SODIUM 5000 UNITS: 5000 INJECTION INTRAVENOUS; SUBCUTANEOUS at 05:02

## 2024-03-25 RX ADMIN — TAMSULOSIN HYDROCHLORIDE 0.4 MG: 0.4 CAPSULE ORAL at 21:28

## 2024-03-25 RX ADMIN — HEPARIN SODIUM 5000 UNITS: 5000 INJECTION INTRAVENOUS; SUBCUTANEOUS at 15:10

## 2024-03-25 RX ADMIN — FINASTERIDE 5 MG: 5 TABLET, FILM COATED ORAL at 21:28

## 2024-03-25 RX ADMIN — NYSTATIN: 100000 POWDER TOPICAL at 21:29

## 2024-03-25 RX ADMIN — HEPARIN SODIUM 5000 UNITS: 5000 INJECTION INTRAVENOUS; SUBCUTANEOUS at 21:28

## 2024-03-25 NOTE — PLAN OF CARE
"Goal Outcome Evaluation:  Plan of Care Reviewed With: patient  Progress: no change  Outcome Evaluation: VSS. Pt drank four orange juices and ate one orange sherbert during the shift. Pt refused HS doses of Proscar and Flomax; although, he would not inform staff why he did not want to take this medication and just stated, \"no.\" Pt had one moderate sized bowel movement this shift. He was intermittently oriented to all but year then later he did not know where he was and requested to be taken out of the building so that he could find an [facility] that would take him to [facility].                               "

## 2024-03-25 NOTE — PROGRESS NOTES
[facility]   Hospitalist Progress Note  Date: 3/25/2024  Patient Name: Ascencion Murphy  : 1940  MRN: [id number]  Date of admission: 3/21/2024  Room/Bed: 3005/1      Subjective   Subjective     Chief Complaint: abdominal pain     Summary:Ascencion Murphy is a 83 y.o. male with a past medical history of BPH, hyperlipidemia presented to the ED for evaluation of diffuse abdominal pain that started yesterday.  Associated with nausea and vomiting x2, abdominal distention.  Not able to pass gas.  Last bowel movement 2 days ago.  Denies any hematemesis, hematochezia, melena.  States that abdominal pain has improved after the NG tube has been placed.  Denies any fevers, chills, chest pain, shortness of breath.     In the ED, temperature 97.8, pulse 74, respiratory 18, blood pressure 166/72 on room air saturating around 99% labs showed CBC, CMP with no significant findings, normal lactic acid, normal lipase, urinalysis is noninfectious.  COVID flu RSV negative.  Chest x-ray showed no acute abnormality.  CT abdomen pelvis with contrast showed distal small bowel obstruction with a relative transition zone in the right lower abdomen.  No focal obstructing lesion is identified.  Trace amount of free fluid in the abdominal pelvis probably reactive.  Colonic diverticulosis without diverticulitis or colitis.  Case has been discussed by the ED physician with general surgeon on-call Dr. Naidu, agreed to consult.  Patient has been admitted for further evaluation and management of small bowel obstruction.    Interval Followup:   N/g removed, diet advanced   Patient is very weak and not gotten out of bed  Not eating much   He does not complain of any nausea, vomiting or abdominal pain  Patient's sister-in-law is at the bedside and states that he lives in a memory unit which is assisted living however feels that he might need rehab placement  Vitals stable     Review of Systems    All systems reviewed and negative except for what is  outlined above.      Objective   Objective     Vitals:   Temp:  [97.7 °F (36.5 °C)-98.8 °F (37.1 °C)] 98.8 °F (37.1 °C)  Heart Rate:  [52-58] 58  Resp:  [16-18] 16  BP: (128-157)/(69-85) 128/74    Physical Exam   General: Awake, alert, NAD resting in bed. Sister in law at the bedside   HENT: NCAT, MMM  Eyes: pupils equal, no scleral icterus  Cardiovascular: RRR, no murmurs   Pulmonary: CTA bilaterally; no wheezes; no conversational dyspnea  Gastrointestinal: S/ND/NT, +BS  Musculoskeletal: No gross deformities  Skin: No jaundice, no rash on exposed skin appreciated  Neuro: CN II through XII grossly intact; speech clear; no tremor  Psych: Mood and affect appropriate  : No Quinn catheter; no suprapubic tenderness    Result Review    Result Review:  I have personally reviewed these results:  [x]  Laboratory      Lab 03/25/24 0520 03/24/24 0523 03/23/24  0638 03/22/24  0609 03/21/24  1925   WBC 8.06 8.84 7.81 9.54 9.32   HEMOGLOBIN 13.2 12.9* 12.8* 13.3 14.2   HEMATOCRIT 40.3 40.8 40.8 42.1 42.9   PLATELETS 191 184 172 199 208   NEUTROS ABS  --   --   --  7.68* 7.27*   IMMATURE GRANS (ABS)  --   --   --  0.04 0.07*   LYMPHS ABS  --   --   --  0.96 0.97   MONOS ABS  --   --   --  0.73 0.84   EOS ABS  --   --   --  0.10 0.13   MCV 97.8* 99.3* 100.2* 99.8* 97.9*   LACTATE  --   --   --   --  1.7         Lab 03/25/24 0520 03/24/24 0523 03/23/24  0638 03/22/24  0609   SODIUM 135* 140 139 139   POTASSIUM 4.3 3.8 4.0 4.0   CHLORIDE 102 103 103 104   CO2 24.1 24.8 26.3 27.4   ANION GAP 8.9 12.2 9.7 7.6   BUN 16 15 11 12   CREATININE 0.75* 0.76 0.80 0.78   EGFR 89.5 89.2 87.8 88.5   GLUCOSE 94 86 96 134*   CALCIUM 8.6 8.7 8.7 9.0   MAGNESIUM 1.9 1.7 1.6 1.7   PHOSPHORUS  --  3.4 2.9 3.4         Lab 03/22/24  0609 03/21/24  1925   TOTAL PROTEIN 6.5 7.3   ALBUMIN 3.4* 3.9   GLOBULIN 3.1 3.4   ALT (SGPT) 8 12   AST (SGOT) 21 23   BILIRUBIN 0.8 0.7   ALK PHOS 88 91   LIPASE  --  15         Lab 03/21/24  1925   PROBNP 85.8                  Brief Urine Lab Results  (Last result in the past 365 days)        Color   Clarity   Blood   Leuk Est   Nitrite   Protein   CREAT   Urine HCG        03/21/24 1951 Dark Yellow   Clear   Negative   Negative   Negative   Trace                 [x]  Microbiology   Microbiology Results (last 10 days)       Procedure Component Value - Date/Time    COVID PRE-OP / PRE-PROCEDURE SCREENING ORDER (NO ISOLATION) - Swab, Nasopharynx [[id number]]  (Normal) Collected: 03/21/24 1915    Lab Status: Final result Specimen: Swab from Nasopharynx Updated: 03/21/24 2004    Narrative:      The following orders were created for panel order COVID PRE-OP / PRE-PROCEDURE SCREENING ORDER (NO ISOLATION) - Swab, Nasopharynx.  Procedure                               Abnormality         Status                     ---------                               -----------         ------                     COVID-19, FLU A/B, RSV P...[[id number]]  Normal              Final result                 Please view results for these tests on the individual orders.    COVID-19, FLU A/B, RSV PCR 1 HR TAT - Swab, Nasopharynx [[id number]]  (Normal) Collected: 03/21/24 1915    Lab Status: Final result Specimen: Swab from Nasopharynx Updated: 03/21/24 2004     COVID19 Not Detected     Influenza A PCR Not Detected     Influenza B PCR Not Detected     RSV, PCR Not Detected    Narrative:      Fact sheet for providers: https://www.fda.gov/media/272072/download    Fact sheet for patients: https://www.fda.gov/media/402918/download    Test performed by PCR.          [x]  Radiology  XR Abdomen Flat & Upright    Result Date: 3/23/2024  Impression: Oral contrast agent progresses to the colon, including the rectum, which is against a complete mechanical small bowel obstruction. Nasogastric tube remains in place.   Electronically Signed By-Ihsan Lam MD On:3/23/2024 10:25 PM      XR Abdomen KUB    Result Date: 3/23/2024  Impression: The distal tip of the nasogastric (NG)  tube is in the expected gastric lumen.   Electronically Signed By-Ihsan Lam MD On:3/23/2024 1:22 AM      XR Abdomen KUB    Result Date: 3/22/2024  Impression: NG tube in the proximal stomach as described. Consider advancing 5 to 10 cm.   Electronically Signed By-Dr. Ed Ray MD On:3/22/2024 1:21 AM      CT Abdomen Pelvis With Contrast    Result Date: 3/22/2024   1. Distal small bowel obstruction with a relative transition zone in the right lower abdomen. No focal obstructing lesion is identified. 2. Trace amount of free fluid in the abdomen and pelvis, probably reactive. 3. Colonic diverticulosis without diverticulitis or colitis. Appendectomy. 4. Additional nonacute findings as above.    Electronically Signed By-Dr. Ed Ray MD On:3/22/2024 12:12 AM      XR Chest 1 View    Result Date: 3/21/2024  Impression: No active pulmonary process.   Electronically Signed By-Johnny Peña MD On:3/21/2024 7:07 PM     []  EKG/Telemetry   []  Cardiology/Vascular   []  Pathology  []  Old records  []  Other:    Assessment & Plan   Assessment / Plan     Assessment:  Small bowel obstruction  Bilateral lower extremity edema, concern for CHF   Hyperlipidemia  Dementia   BPH    Plan:  Surgery following. gastrografin challenge was normal   Diet advanced and n/g removed   Echo appears fine  Resume home meds when able  PT/OT rehab placement as patent has not gotten out of bed and lives at an AL facility in memory care   Repeat labs in am        Discussed with RN.    DVT prophylaxis:  Medical DVT prophylaxis orders are present.        CODE STATUS:   Level Of Support Discussed With: Patient  Code Status (Patient has no pulse and is not breathing): CPR (Attempt to Resuscitate)  Medical Interventions (Patient has pulse or is breathing): Full Support      Electronically signed by Patrice Contreras DO, 3/25/2024, 10:24 EDT.

## 2024-03-25 NOTE — PROGRESS NOTES
"PROGRESS NOTE     Patient Name:  Ascencion Murphy  YOB: 1940  [id number]   LOS: 3 days   * No surgery found *  Patient Care Team:  Prudencio Naylor MD as PCP - General (Internal Medicine)      Reason for Consult/Chief complaint: follow up on bowel obstruction    Subjective     Interval History: VSS, afebrile, tolerating diet, no abdominal pain, +BMs      Review of Systems:      A complete review of systems was performed and all are negative except what is documented in the HPI.       Objective         Physical Exam:     Constitutional:  well nourished, no acute distress, appears stated age /74 (BP Location: Left arm, Patient Position: Lying)   Pulse 58   Temp 98.8 °F (37.1 °C) (Oral)   Resp 16   Ht 180.3 cm (70.98\")   Wt 107 kg (235 lb 14.3 oz)   SpO2 91%   BMI 32.92 kg/m²    Eyes:  anicteric sclerae, moist conjunctivae, no lid lag, PERRLA  ENMT:  oropharynx clear, moist mucous membranes, good dentition  Neck:   full ROM, trachea midline, no thyromegaly  Cardiovascular: RRR, S1 and S2 present, no MRG, heart rate 58, no pedal edema  Respiratory: lungs CTA, respirations even and unlabored  GI:  Abdomen soft, nontender, nondistended, no HSM     Skin:  warm and dry, normal turgor, no rashes  Psychiatric:  alert and oriented x3, intact judgment and insight, cooperative    Results Review:      I reviewed the patient's new clinical results including CBC.  LABS:  WBC   Date Value Ref Range Status   03/25/2024 8.06 3.40 - 10.80 10*3/mm3 Final     RBC   Date Value Ref Range Status   03/25/2024 4.12 (L) 4.14 - 5.80 10*6/mm3 Final     Hemoglobin   Date Value Ref Range Status   03/25/2024 13.2 13.0 - 17.7 g/dL Final     Hematocrit   Date Value Ref Range Status   03/25/2024 40.3 37.5 - 51.0 % Final     MCV   Date Value Ref Range Status   03/25/2024 97.8 (H) 79.0 - 97.0 fL Final     MCH   Date Value Ref Range Status   03/25/2024 32.0 26.6 - 33.0 pg Final     MCHC   Date Value Ref Range Status " "  03/25/2024 32.8 31.5 - 35.7 g/dL Final     RDW   Date Value Ref Range Status   03/25/2024 12.7 12.3 - 15.4 % Final     RDW-SD   Date Value Ref Range Status   03/25/2024 45.5 37.0 - 54.0 fl Final     MPV   Date Value Ref Range Status   03/25/2024 11.8 6.0 - 12.0 fL Final     Platelets   Date Value Ref Range Status   03/25/2024 191 140 - 450 10*3/mm3 Final         Basic Metabolic Panel    Sodium Sodium   Date Value Ref Range Status   03/25/2024 135 (L) 136 - 145 mmol/L Final   03/24/2024 140 136 - 145 mmol/L Final   03/23/2024 139 136 - 145 mmol/L Final      Potassium Potassium   Date Value Ref Range Status   03/25/2024 4.3 3.5 - 5.2 mmol/L Final   03/24/2024 3.8 3.5 - 5.2 mmol/L Final   03/23/2024 4.0 3.5 - 5.2 mmol/L Final      Chloride Chloride   Date Value Ref Range Status   03/25/2024 102 98 - 107 mmol/L Final   03/24/2024 103 98 - 107 mmol/L Final   03/23/2024 103 98 - 107 mmol/L Final      Bicarbonate No results found for: \"PLASMABICARB\"   BUN BUN   Date Value Ref Range Status   03/25/2024 16 8 - 23 mg/dL Final   03/24/2024 15 8 - 23 mg/dL Final   03/23/2024 11 8 - 23 mg/dL Final      Creatinine Creatinine   Date Value Ref Range Status   03/25/2024 0.75 (L) 0.76 - 1.27 mg/dL Final   03/24/2024 0.76 0.76 - 1.27 mg/dL Final   03/23/2024 0.80 0.76 - 1.27 mg/dL Final      Calcium Calcium   Date Value Ref Range Status   03/25/2024 8.6 8.6 - 10.5 mg/dL Final   03/24/2024 8.7 8.6 - 10.5 mg/dL Final   03/23/2024 8.7 8.6 - 10.5 mg/dL Final      Glucose      No components found for: \"GLUCOSE.*\"         IMAGING:        Medications:    Current Facility-Administered Medications:     acetaminophen (TYLENOL) tablet 1,000 mg, 1,000 mg, Oral, Q6H PRN, Paul Naidu MD    aspirin EC tablet 81 mg, 81 mg, Oral, Daily, Patrice Contreras DO    finasteride (PROSCAR) tablet 5 mg, 5 mg, Oral, Nightly, Patrice Contreras DO    heparin (porcine) 5000 UNIT/ML injection 5,000 Units, 5,000 Units, Subcutaneous, Q8H, Manuela Moyer MD, " 5,000 Units at 03/25/24 0502    HYDROcodone-acetaminophen (NORCO) 5-325 MG per tablet 1 tablet, 1 tablet, Oral, Q8H PRN, Paul Naidu MD    nitroglycerin (NITROSTAT) SL tablet 0.4 mg, 0.4 mg, Sublingual, Q5 Min PRN, Manuela Moyer MD    nystatin (MYCOSTATIN) powder, , Topical, Q12H, Patrice Contreras DO, Given at 03/25/24 0925    sodium chloride 0.9 % flush 10 mL, 10 mL, Intravenous, PRN, Manuela Moyer MD    sodium chloride 0.9 % flush 10 mL, 10 mL, Intravenous, Q12H, Manuela Moyer MD, 10 mL at 03/24/24 0800    sodium chloride 0.9 % flush 10 mL, 10 mL, Intravenous, PRN, Manuela Moyer MD    sodium chloride 0.9 % infusion 40 mL, 40 mL, Intravenous, João FREDERICK Ranadheer R, MD    tamsulosin (FLOMAX) 24 hr capsule 0.4 mg, 0.4 mg, Oral, Nightly, Patrice Contreras DO    Assessment & Plan       SBO (small bowel obstruction)   OK to [facility]   General surgery signing off      JAIME Puentes  03/25/24  10:03 EDT    Electronically signed by JAIME Puentes, 03/25/24, 10:03 AM EDT.

## 2024-03-25 NOTE — PLAN OF CARE
Goal Outcome Evaluation:         Pt is alert to self. During shift pt refused oral medication. Educated pt on importance of taking medication but declined. Did receive heparin injection during shift. Pt is not wanting to eat his meals. DO Ben is aware along with sister in law Mildred. Vitals stable during shift. Continue care plan.

## 2024-03-25 NOTE — PLAN OF CARE
Goal Outcome Evaluation:  Plan of Care Reviewed With: patient   Pt advanced to full liquid diet from NPO. Pt became agitated at later part of shift. Pt took out IV. MD  notified of agitation. Had one large, liquid bowel movement. Refused meals after multiple attempts to assist pt in feeding.     Progress: improving

## 2024-03-26 VITALS
HEIGHT: 71 IN | RESPIRATION RATE: 18 BRPM | WEIGHT: 235.89 LBS | SYSTOLIC BLOOD PRESSURE: 160 MMHG | OXYGEN SATURATION: 96 % | BODY MASS INDEX: 33.02 KG/M2 | DIASTOLIC BLOOD PRESSURE: 75 MMHG | HEART RATE: 56 BPM | TEMPERATURE: 98.2 F

## 2024-03-26 PROCEDURE — 97110 THERAPEUTIC EXERCISES: CPT

## 2024-03-26 PROCEDURE — 99239 HOSP IP/OBS DSCHRG MGMT >30: CPT | Performed by: STUDENT IN AN ORGANIZED HEALTH CARE EDUCATION/TRAINING PROGRAM

## 2024-03-26 RX ADMIN — NYSTATIN: 100000 POWDER TOPICAL at 08:16

## 2024-03-26 NOTE — THERAPY TREATMENT NOTE
"Acute Care - Physical Therapy Treatment Note   [facility]     Patient Name: Ascencion Murphy  : 1940  MRN: [id number]  Today's Date: 3/26/2024      Visit Dx:     ICD-10-CM ICD-9-CM   1. Small bowel obstruction  K56.609 560.9   2. Generalized abdominal pain  R10.84 789.07   3. Nausea and vomiting, unspecified vomiting type  R11.2 787.01   4. Impaired mobility and ADLs  Z74.09 V49.89    Z78.9    5. Difficulty walking  R26.2 719.7     Patient Active Problem List   Diagnosis    SBO (small bowel obstruction)     Past Medical History:   Diagnosis Date    Cancer     skin Cancer    Enlarged prostate     hx obtained from info sheet from [employer]    Hyperlipidemia     Hypertension     from  visit office note of      Past Surgical History:   Procedure Laterality Date    APPENDECTOMY      COLON SURGERY      part of bowel removed    COLONOSCOPY      in [place]    EYE SURGERY      cataract surgery     PT Assessment (Last 12 Hours)       PT Evaluation and Treatment       Row Name 24 1434          Physical Therapy Time and Intention    Subjective Information complains of;weakness;fatigue;pain  -DK     Document Type therapy note (daily note)  -DK     Mode of Treatment individual therapy;physical therapy  -DK     Patient Effort fair  -DK     Symptoms Noted During/After Treatment fatigue;increased pain  -DK     Comment Pt reports some buttock pain, waiting for \"someone to help me get out of here\".  -DK       Row Name 24 1434          Pain    Pretreatment Pain Rating 2/10  -DK     Posttreatment Pain Rating 2/10  -DK     Pain Location generalized  -DK     Pain Location - buttock;back  -DK     Pain Intervention(s) Distraction;Therapeutic presence  -DK       Row Name 24 1434          Cognition    Affect/Mental Status (Cognition) confused;low arousal/lethargic  -DK     Orientation Status (Cognition) oriented to;person  -DK     Follows Commands (Cognition) physical/tactile prompts required;repetition " of directions required;verbal cues/prompting required  -DK     Cognitive Function attention deficit  -DK     Attention Deficit (Cognition) minimal deficit;arousal/alertness;concentration;focused/sustained attention  -DK     Personal Safety Interventions gait belt;nonskid shoes/slippers when out of bed;supervised activity  -       Row Name 03/26/24 1434          Motor Skills    Motor Skills --  therapeutic exercises  -DK     Coordination WFL  -     Therapeutic Exercise hip;knee;ankle  -     Additional Documentation --  Pt does tend to hold his hips and knees somewhat stiff during exercises.  Pt was dozing during exercises.  -       Row Name 03/26/24 1434          Hip (Therapeutic Exercise)    Hip (Therapeutic Exercise) AAROM (active assistive range of motion)  -     Hip AAROM (Therapeutic Exercise) bilateral;flexion;extension;aBduction;aDduction;supine;10 repetitions;2 sets  -       Row Name 03/26/24 1434          Knee (Therapeutic Exercise)    Knee (Therapeutic Exercise) AAROM (active assistive range of motion)  -     Knee AAROM (Therapeutic Exercise) bilateral;flexion;extension;supine;10 repetitions;2 sets  -       Row Name 03/26/24 1434          Ankle (Therapeutic Exercise)    Ankle (Therapeutic Exercise) AAROM (active assistive range of motion)  -     Ankle AAROM (Therapeutic Exercise) bilateral;dorsiflexion;plantarflexion;supine;10 repetitions;2 sets  -       Row Name             Wound 03/22/24 0400 Right anterior groin Other (comment)    Wound - Properties Group Placement Date: 03/22/24  -MP, 0400  Placement Time: 0400  -MP Side: Right  -MP Orientation: anterior  -MP Location: groin  -MP Primary Wound Type: Other  -MP, yeasty     Retired Wound - Properties Group Placement Date: 03/22/24  -MP, 0400  Placement Time: 0400  -MP Side: Right  -MP Orientation: anterior  -MP Location: groin  -MP Primary Wound Type: Other  -MP, yeasty     Retired Wound - Properties Group Date first assessed: 03/22/24   -MP, 0400  Time first assessed: 0400  -MP Side: Right  -MP Location: groin  -MP Primary Wound Type: Other  -MP, yeasty       Row Name             Wound 03/22/24 0400 Left anterior groin Other (comment)    Wound - Properties Group Placement Date: 03/22/24  -MP Placement Time: 0400  -MP Present on Original Admission: Y  -MP Side: Left  -MP Orientation: anterior  -MP Location: groin  -MP Primary Wound Type: Other  -MP, yeast rash     Retired Wound - Properties Group Placement Date: 03/22/24  -MP Placement Time: 0400  -MP Present on Original Admission: Y  -MP Side: Left  -MP Orientation: anterior  -MP Location: groin  -MP Primary Wound Type: Other  -MP, yeast rash     Retired Wound - Properties Group Date first assessed: 03/22/24  -MP Time first assessed: 0400  -MP Present on Original Admission: Y  -MP Side: Left  -MP Location: groin  -MP Primary Wound Type: Other  -MP, yeast rash       Row Name             Wound 03/22/24 0400 Bilateral gluteal Other (comment)    Wound - Properties Group Placement Date: 03/22/24  -MP Placement Time: 0400  -MP Present on Original Admission: Y  -MP Side: Bilateral  -MP Location: gluteal  -MP Primary Wound Type: Other  -MP, redness blanchable     Retired Wound - Properties Group Placement Date: 03/22/24  -MP Placement Time: 0400  -MP Present on Original Admission: Y  -MP Side: Bilateral  -MP Location: gluteal  -MP Primary Wound Type: Other  -MP, redness blanchable     Retired Wound - Properties Group Date first assessed: 03/22/24  -MP Time first assessed: 0400  -MP Present on Original Admission: Y  -MP Side: Bilateral  -MP Location: gluteal  -MP Primary Wound Type: Other  -MP, redness blanchable       Row Name             Wound 03/26/24 0120 nose Pressure Injury    Wound - Properties Group Placement Date: 03/26/24  -MD Placement Time: 0120  -MD Present on Original Admission: N  -MD Location: nose  -MD Primary Wound Type: Pressure inj  -MD    Retired Wound - Properties Group Placement  Date: 03/26/24  -MD Placement Time: 0120  -MD Present on Original Admission: N  -MD Location: nose  -MD Primary Wound Type: Pressure inj  -MD    Retired Wound - Properties Group Date first assessed: 03/26/24  -MD Time first assessed: 0120  -MD Present on Original Admission: N  -MD Location: nose  -MD Primary Wound Type: Pressure inj  -MD      Row Name 03/26/24 1434          Plan of Care Review    Plan of Care Reviewed With patient  -DK     Progress no change  -DK       Row Name 03/26/24 1434          Positioning and Restraints    Pre-Treatment Position in bed  -DK     Post Treatment Position bed  -DK     In Bed supine;call light within reach;encouraged to call for assist;exit alarm on;side rails up x2;legs elevated;heels elevated  -DK       Row Name 03/26/24 1434          Therapy Assessment/Plan (PT)    Rehab Potential (PT) fair, will monitor progress closely  -DK     Criteria for Skilled Interventions Met (PT) skilled treatment is necessary  -DK     Therapy Frequency (PT) daily  -DK     Problem List (PT) problems related to;balance;cognition;mobility;range of motion (ROM);strength;pain;hearing;communication  -DK     Activity Limitations Related to Problem List (PT) unable to ambulate safely;unable to transfer safely  -DK       Row Name 03/26/24 1434          Progress Summary (PT)    Progress Toward Functional Goals (PT) progress toward functional goals is fair  -DK               User Key  (r) = Recorded By, (t) = Taken By, (c) = Cosigned By      Initials Name Provider Type    Shanthi Gama PTA Physical Therapist Assistant    Jessica Sneed RN Registered Nurse    Anyi Parish RN Registered Nurse                      PT Recommendation and Plan  Planned Therapy Interventions (PT): balance training, gait training, bed mobility training, strengthening, transfer training  Therapy Frequency (PT): daily  Progress Summary (PT)  Progress Toward Functional Goals (PT): progress toward functional goals is  fair  Plan of Care Reviewed With: patient  Progress: no change   Outcome Measures       Row Name 03/26/24 1434 03/24/24 1159          How much help from another person do you currently need...    Turning from your back to your side while in flat bed without using bedrails? 2  -DK 2  -CS     Moving from lying on back to sitting on the side of a flat bed without bedrails? 2  -DK 2  -CS     Moving to and from a bed to a chair (including a wheelchair)? 2  -DK 2  -CS     Standing up from a chair using your arms (e.g., wheelchair, bedside chair)? 2  -DK 2  -CS     Climbing 3-5 steps with a railing? 1  -DK 1  -CS     To walk in hospital room? 1  -DK 2  -CS     AM-PAC 6 Clicks Score (PT) 10  -DK 11  -CS     Highest Level of Mobility Goal 4 --> Transfer to chair/commode  -DK 4 --> Transfer to chair/commode  -CS        Functional Assessment    Outcome Measure Options AM-PAC 6 Clicks Basic Mobility (PT)  -DK AM-PAC 6 Clicks Basic Mobility (PT)  -CS               User Key  (r) = Recorded By, (t) = Taken By, (c) = Cosigned By      Initials Name Provider Type    Shanthi Gama PTA Physical Therapist Assistant    Hanna Javed PT Physical Therapist                     Time Calculation:    PT Charges       Row Name 03/26/24 1437             Time Calculation    PT Received On 03/26/24  -DK      PT Goal Re-Cert Due Date 04/02/24  -DK         Timed Charges    53792 - PT Therapeutic Exercise Minutes 14  -DK      81125 - PT Therapeutic Activity Minutes 3  -DK         Total Minutes    Timed Charges Total Minutes 17  -DK       Total Minutes 17  -DK                User Key  (r) = Recorded By, (t) = Taken By, (c) = Cosigned By      Initials Name Provider Type    Shanthi Gama PTA Physical Therapist Assistant                  Therapy Charges for Today       Code Description Service Date Service Provider Modifiers Qty    [id number] HC PT THER PROC EA 15 MIN 3/26/2024 Shanthi Jane PTA GP 1            PT G-Codes  Outcome  Measure Options: AM-PAC 6 Clicks Basic Mobility (PT)  AM-PAC 6 Clicks Score (PT): 10  AM-PAC 6 Clicks Score (OT): 14    Shanthi Jane, PTA  3/26/2024

## 2024-03-26 NOTE — SIGNIFICANT NOTE
Wound Eval / Progress Noted    [facility]     Patient Name: Ascencion Murphy  : 1940  MRN: [id number]  Today's Date: 3/26/2024                 Admit Date: 3/21/2024    Visit Dx:    ICD-10-CM ICD-9-CM   1. Small bowel obstruction  K56.609 560.9   2. Generalized abdominal pain  R10.84 789.07   3. Nausea and vomiting, unspecified vomiting type  R11.2 787.01   4. Impaired mobility and ADLs  Z74.09 V49.89    Z78.9    5. Difficulty walking  R26.2 719.7         SBO (small bowel obstruction)        Past Medical History:   Diagnosis Date    Cancer     skin Cancer    Enlarged prostate     hx obtained from info sheet from [facility]    Hyperlipidemia     Hypertension     from  visit office note of         Past Surgical History:   Procedure Laterality Date    APPENDECTOMY      COLON SURGERY      part of bowel removed    COLONOSCOPY      in [place]    EYE SURGERY      cataract surgery         Physical Assessment:  Wound 24 0120 nose Pressure Injury (Active)   Wound Image   24 0121   Pressure Injury Stage 2 24 1020   Dressing Appearance intact;dry 24 1020   Closure None 24 1020   Base non-blanchable;red;moist 24 1020   Red (%), Wound Tissue Color 100 24 1020   Periwound intact;dry;pink 24 1020   Periwound Temperature warm 24 1020   Edges open 24 1020   Drainage Characteristics/Odor serosanguineous 24 1020   Drainage Amount scant 24 1020   Care, Wound cleansed with;sterile normal saline 24 1020   Dressing Care dressing applied;silver impregnated;hydrofiber;elastic bandage 24 1020   Periwound Care absorptive dressing applied 24 1020        Wound Check / Follow-up: Patient was seen today for a wound consult. Patient is awake alert; patient is confused. Patient wearing sunglasses, dressing covering the bridge of patients nose.    Removed dressing with no issues. Left side of the patients nose with red moist non-blanchable  tissue likely pressure from the nose piece on the glasses. Cleansed with NS. Recommending daily dressing changes with silver impregnated hydrofiber to the wound base and secure with a band aid. Posterior ears without discoloration; recommend to continue padding the earpiece of the glasses with gauze or foam.     Impression: stage II pressure injury to the left nose    Short term goals:  regain skin integrity, skin protection, daily dressing changes.    Marianne Mendez RN    3/26/2024    15:55 EDT

## 2024-03-26 NOTE — DISCHARGE SUMMARY
[facility]         HOSPITALIST  DISCHARGE SUMMARY    Patient Name: Ascencion Murphy  : 1940  MRN: [id number]    Date of Admission: 3/21/2024  Date of Discharge:  3/26/2024    Primary Care Physician: Prudencio Naylor MD    Consults       Date and Time Order Name Status Description    3/22/2024 12:51 AM Inpatient Hospitalist Consult      3/22/2024 12:45 AM General MD Inpatient Consult Completed             Active and Resolved Hospital Problems:  Active Hospital Problems    Diagnosis POA    **SBO (small bowel obstruction) [K56.609] Yes      Resolved Hospital Problems   No resolved problems to display.       Hospital Course     Hospital Course:  Ascencion Murphy is a 83 y.o. male with a past medical history of BPH, hyperlipidemia presented to the ED for evaluation of diffuse abdominal pain that started yesterday.  Associated with nausea and vomiting x2, abdominal distention.  Not able to pass gas.      CT abdomen pelvis with contrast showed distal small bowel obstruction with a relative transition zone in the right lower abdomen.  No focal obstructing lesion is identified.  Trace amount of free fluid in the abdominal pelvis probably reactive.  General surgery was consulted and recommended conservative management with NG tube placement n.p.o. status.  Echo was obtained due to bilateral lower extremity swelling, noted to have some diastolic dysfunction.  He began having some flatus and bowel movements.  Gastrografin study was completed and showed contrast in the colon.  Recommended that the patient go to rehab following discharge.    Patient discharged in stable condition.    DISCHARGE Follow Up Recommendations for labs and diagnostics: Follow-up with PCP in 1 week.      Day of Discharge     Vital Signs:  Temp:  [97.3 °F (36.3 °C)-98.2 °F (36.8 °C)] 98.2 °F (36.8 °C)  Heart Rate:  [55-64] 56  Resp:  [16-18] 18  BP: (128-176)/(64-90) 160/75    Physical Exam:   Gen: NAD, Alert, not oriented, answering  questions  Cards: RRR, no murmur   Pulm: CTA b/l, no wheezing  Abd: soft, nondistended  Extremities: no pitting edema      Discharge Details        Discharge Medications        Continue These Medications        Instructions Start Date   aspirin 81 MG EC tablet   81 mg, Oral, Daily      dutasteride 0.5 MG capsule  Commonly known as: AVODART   0.5 mg, Oral, Daily      simvastatin 10 MG tablet  Commonly known as: ZOCOR   10 mg, Oral, Nightly      tamsulosin 0.4 MG capsule 24 hr capsule  Commonly known as: FLOMAX   0.4 mg, Oral, Daily      vitamin B-12 500 MCG tablet  Commonly known as: CYANOCOBALAMIN   500 mcg, Oral, Daily               Allergies   Allergen Reactions    Penicillins Unknown - Low Severity       Discharge Disposition:  Skilled Nursing Facility (DC - External)    Diet:  Hospital:  Diet Order   Procedures    Diet: Regular/House; Fluid Consistency: Thin (IDDSI 0)       Discharge Activity:       CODE STATUS:  Code Status and Medical Interventions:   Ordered at: 03/22/24 0200     Level Of Support Discussed With:    Patient     Code Status (Patient has no pulse and is not breathing):    CPR (Attempt to Resuscitate)     Medical Interventions (Patient has pulse or is breathing):    Full Support         No future appointments.    Additional Instructions for the Follow-ups that You Need to Schedule       Discharge Follow-up with PCP   As directed       Currently Documented PCP:    Prudencio Naylor MD    PCP Phone Number:    [phone]     Follow Up Details: one week                Pertinent  and/or Most Recent Results         LAB RESULTS:      Lab 03/25/24  0520 03/24/24  0523 03/23/24  0638 03/22/24  0609 03/21/24  1925   WBC 8.06 8.84 7.81 9.54 9.32   HEMOGLOBIN 13.2 12.9* 12.8* 13.3 14.2   HEMATOCRIT 40.3 40.8 40.8 42.1 42.9   PLATELETS 191 184 172 199 208   NEUTROS ABS  --   --   --  7.68* 7.27*   IMMATURE GRANS (ABS)  --   --   --  0.04 0.07*   LYMPHS ABS  --   --   --  0.96 0.97   MONOS ABS  --   --    --  0.73 0.84   EOS ABS  --   --   --  0.10 0.13   MCV 97.8* 99.3* 100.2* 99.8* 97.9*   LACTATE  --   --   --   --  1.7         Lab 03/25/24  0520 03/24/24  0523 03/23/24  0638 03/22/24  0609 03/21/24 1925   SODIUM 135* 140 139 139 140   POTASSIUM 4.3 3.8 4.0 4.0 4.0   CHLORIDE 102 103 103 104 102   CO2 24.1 24.8 26.3 27.4 28.7   ANION GAP 8.9 12.2 9.7 7.6 9.3   BUN 16 15 11 12 15   CREATININE 0.75* 0.76 0.80 0.78 0.91   EGFR 89.5 89.2 87.8 88.5 83.6   GLUCOSE 94 86 96 134* 163*   CALCIUM 8.6 8.7 8.7 9.0 9.5   MAGNESIUM 1.9 1.7 1.6 1.7 1.8   PHOSPHORUS  --  3.4 2.9 3.4 3.0         Lab 03/22/24  0609 03/21/24 1925   TOTAL PROTEIN 6.5 7.3   ALBUMIN 3.4* 3.9   GLOBULIN 3.1 3.4   ALT (SGPT) 8 12   AST (SGOT) 21 23   BILIRUBIN 0.8 0.7   ALK PHOS 88 91   LIPASE  --  15         Lab 03/21/24 1925   PROBNP 85.8                 Brief Urine Lab Results  (Last result in the past 365 days)        Color   Clarity   Blood   Leuk Est   Nitrite   Protein   CREAT   Urine HCG        03/21/24 1951 Dark Yellow   Clear   Negative   Negative   Negative   Trace                 Microbiology Results (last 10 days)       Procedure Component Value - Date/Time    COVID PRE-OP / PRE-PROCEDURE SCREENING ORDER (NO ISOLATION) - Swab, Nasopharynx [[id number]]  (Normal) Collected: 03/21/24 1915    Lab Status: Final result Specimen: Swab from Nasopharynx Updated: 03/21/24 2004    Narrative:      The following orders were created for panel order COVID PRE-OP / PRE-PROCEDURE SCREENING ORDER (NO ISOLATION) - Swab, Nasopharynx.  Procedure                               Abnormality         Status                     ---------                               -----------         ------                     COVID-19, FLU A/B, RSV P...[[id number]]  Normal              Final result                 Please view results for these tests on the individual orders.    COVID-19, FLU A/B, RSV PCR 1 HR TAT - Swab, Nasopharynx [[id number]]  (Normal) Collected: 03/21/24 1915     Lab Status: Final result Specimen: Swab from Nasopharynx Updated: 03/21/24 2004     COVID19 Not Detected     Influenza A PCR Not Detected     Influenza B PCR Not Detected     RSV, PCR Not Detected    Narrative:      Fact sheet for providers: https://www.fda.gov/media/456551/download    Fact sheet for patients: https://www.fda.gov/media/967060/download    Test performed by PCR.            XR Abdomen Flat & Upright    Result Date: 3/23/2024  Impression: Oral contrast agent progresses to the colon, including the rectum, which is against a complete mechanical small bowel obstruction. Nasogastric tube remains in place.   Electronically Signed By-Ihsan Lam MD On:3/23/2024 10:25 PM      XR Abdomen KUB    Result Date: 3/23/2024  Impression: The distal tip of the nasogastric (NG) tube is in the expected gastric lumen.   Electronically Signed By-Ihsan Lam MD On:3/23/2024 1:22 AM      XR Abdomen KUB    Result Date: 3/22/2024  Impression: NG tube in the proximal stomach as described. Consider advancing 5 to 10 cm.   Electronically Signed By-Dr. Ed Ray MD On:3/22/2024 1:21 AM      CT Abdomen Pelvis With Contrast    Result Date: 3/22/2024   1. Distal small bowel obstruction with a relative transition zone in the right lower abdomen. No focal obstructing lesion is identified. 2. Trace amount of free fluid in the abdomen and pelvis, probably reactive. 3. Colonic diverticulosis without diverticulitis or colitis. Appendectomy. 4. Additional nonacute findings as above.    Electronically Signed By-Dr. Ed Ray MD On:3/22/2024 12:12 AM      XR Chest 1 View    Result Date: 3/21/2024  Impression: No active pulmonary process.   Electronically Signed ByAna Peña MD On:3/21/2024 7:07 PM                Results for orders placed during the hospital encounter of 03/21/24    Adult Transthoracic Echo Complete W/ Cont if Necessary Per Protocol    Interpretation Summary    Left ventricular systolic function is  normal. Calculated left ventricular EF = 69%    Left ventricular diastolic function was indeterminate.    The right ventricular cavity is mild to moderately dilated.    Estimated right ventricular systolic pressure from tricuspid regurgitation is normal (<35 mmHg).              Time spent on Discharge including face to face service:  >30 minutes    Electronically signed by Cristina Mann DO, 03/26/24, 3:14 PM EDT.

## 2024-03-26 NOTE — PLAN OF CARE
Goal Outcome Evaluation:  Plan of Care Reviewed With: patient   Pt answered all orientation questions correctly, forgetful at times. Small open skin injury from nose pads of glasses assessed, wet to dry dressing placed after cleansing with NS. Pt has no complaints of pain during this shift. Continuing with plan of care.

## 2024-03-26 NOTE — PLAN OF CARE
Goal Outcome Evaluation:              Outcome Evaluation: Pt vss. Pt resting well during shift. Wound care completed with wound RN. No complaints of pain. Pt disoriented to place and situation. Pt refused medications despite education. See discharge care plan for further details.

## 2025-08-24 ENCOUNTER — ANESTHESIA EVENT (OUTPATIENT)
Dept: PERIOP | Facility: HOSPITAL | Age: 85
End: 2025-08-24
Payer: MEDICARE

## 2025-08-24 ENCOUNTER — ANESTHESIA (OUTPATIENT)
Dept: PERIOP | Facility: HOSPITAL | Age: 85
End: 2025-08-24
Payer: MEDICARE

## 2025-08-24 ENCOUNTER — HOSPITAL ENCOUNTER (INPATIENT)
Facility: HOSPITAL | Age: 85
LOS: 5 days | Discharge: NURSING, REHAB OR LONG-TERM CARE | End: 2025-08-29
Attending: EMERGENCY MEDICINE
Payer: MEDICARE

## 2025-08-24 ENCOUNTER — APPOINTMENT (OUTPATIENT)
Dept: GENERAL RADIOLOGY | Facility: HOSPITAL | Age: 85
End: 2025-08-24
Payer: MEDICARE

## 2025-08-24 ENCOUNTER — APPOINTMENT (OUTPATIENT)
Dept: CT IMAGING | Facility: HOSPITAL | Age: 85
End: 2025-08-24
Payer: MEDICARE

## 2025-08-24 DIAGNOSIS — J18.9 PNEUMONIA OF RIGHT LUNG DUE TO INFECTIOUS ORGANISM, UNSPECIFIED PART OF LUNG: ICD-10-CM

## 2025-08-24 DIAGNOSIS — E83.39 HYPOPHOSPHATEMIA: ICD-10-CM

## 2025-08-24 DIAGNOSIS — K56.609 SMALL BOWEL OBSTRUCTION: ICD-10-CM

## 2025-08-24 DIAGNOSIS — E83.42 HYPOMAGNESEMIA: ICD-10-CM

## 2025-08-24 DIAGNOSIS — R26.2 DIFFICULTY WALKING: ICD-10-CM

## 2025-08-24 DIAGNOSIS — A41.9 SEPSIS WITHOUT ACUTE ORGAN DYSFUNCTION, DUE TO UNSPECIFIED ORGANISM: Primary | ICD-10-CM

## 2025-08-24 DIAGNOSIS — R33.9 URINARY RETENTION: ICD-10-CM

## 2025-08-24 DIAGNOSIS — K56.41 FECAL IMPACTION: ICD-10-CM

## 2025-08-24 PROBLEM — G30.9 ALZHEIMER DEMENTIA: Status: ACTIVE | Noted: 2025-08-24

## 2025-08-24 PROBLEM — R93.5 ABNORMAL ABDOMINAL CT SCAN: Status: ACTIVE | Noted: 2025-08-24

## 2025-08-24 PROBLEM — F02.80 ALZHEIMER DEMENTIA: Status: ACTIVE | Noted: 2025-08-24

## 2025-08-24 LAB
ALBUMIN SERPL-MCNC: 3.9 G/DL (ref 3.5–5.2)
ALBUMIN/GLOB SERPL: 1.1 G/DL
ALP SERPL-CCNC: 105 U/L (ref 39–117)
ALT SERPL W P-5'-P-CCNC: 17 U/L (ref 1–41)
ANION GAP SERPL CALCULATED.3IONS-SCNC: 15.8 MMOL/L (ref 5–15)
AST SERPL-CCNC: 23 U/L (ref 1–40)
BACTERIA UR QL AUTO: ABNORMAL /HPF
BASOPHILS # BLD AUTO: 0.03 10*3/MM3 (ref 0–0.2)
BASOPHILS NFR BLD AUTO: 0.8 % (ref 0–1.5)
BILIRUB SERPL-MCNC: 1 MG/DL (ref 0–1.2)
BILIRUB UR QL STRIP: ABNORMAL
BUN SERPL-MCNC: 27.6 MG/DL (ref 8–23)
BUN/CREAT SERPL: 27.9 (ref 7–25)
CALCIUM SPEC-SCNC: 9.7 MG/DL (ref 8.6–10.5)
CHLORIDE SERPL-SCNC: 96 MMOL/L (ref 98–107)
CLARITY UR: ABNORMAL
CO2 SERPL-SCNC: 22.2 MMOL/L (ref 22–29)
COLOR UR: ABNORMAL
CREAT SERPL-MCNC: 0.99 MG/DL (ref 0.76–1.27)
D-LACTATE SERPL-SCNC: 4.1 MMOL/L (ref 0.5–2)
D-LACTATE SERPL-SCNC: 4.8 MMOL/L (ref 0.5–2)
D-LACTATE SERPL-SCNC: 5.9 MMOL/L (ref 0.5–2)
DEPRECATED RDW RBC AUTO: 46.6 FL (ref 37–54)
EGFRCR SERPLBLD CKD-EPI 2021: 74.7 ML/MIN/1.73
EOSINOPHIL # BLD AUTO: 0 10*3/MM3 (ref 0–0.4)
EOSINOPHIL NFR BLD AUTO: 0 % (ref 0.3–6.2)
ERYTHROCYTE [DISTWIDTH] IN BLOOD BY AUTOMATED COUNT: 12.8 % (ref 12.3–15.4)
GEN 5 1HR TROPONIN T REFLEX: 40 NG/L
GLOBULIN UR ELPH-MCNC: 3.5 GM/DL
GLUCOSE SERPL-MCNC: 192 MG/DL (ref 65–99)
GLUCOSE UR STRIP-MCNC: NEGATIVE MG/DL
GRAN CASTS URNS QL MICRO: ABNORMAL /LPF
HBA1C MFR BLD: 5.7 % (ref 4.8–5.6)
HCT VFR BLD AUTO: 49 % (ref 37.5–51)
HGB BLD-MCNC: 16.2 G/DL (ref 13–17.7)
HGB UR QL STRIP.AUTO: ABNORMAL
HOLD SPECIMEN: NORMAL
HYALINE CASTS UR QL AUTO: ABNORMAL /LPF
IMM GRANULOCYTES # BLD AUTO: 0.01 10*3/MM3 (ref 0–0.05)
IMM GRANULOCYTES NFR BLD AUTO: 0.3 % (ref 0–0.5)
KETONES UR QL STRIP: NEGATIVE
LEUKOCYTE ESTERASE UR QL STRIP.AUTO: ABNORMAL
LYMPHOCYTES # BLD AUTO: 0.16 10*3/MM3 (ref 0.7–3.1)
LYMPHOCYTES NFR BLD AUTO: 4.1 % (ref 19.6–45.3)
MAGNESIUM SERPL-MCNC: 1.7 MG/DL (ref 1.6–2.4)
MCH RBC QN AUTO: 32.7 PG (ref 26.6–33)
MCHC RBC AUTO-ENTMCNC: 33.1 G/DL (ref 31.5–35.7)
MCV RBC AUTO: 99 FL (ref 79–97)
MONOCYTES # BLD AUTO: 0.45 10*3/MM3 (ref 0.1–0.9)
MONOCYTES NFR BLD AUTO: 11.5 % (ref 5–12)
MRSA DNA SPEC QL NAA+PROBE: NORMAL
NEUTROPHILS NFR BLD AUTO: 3.28 10*3/MM3 (ref 1.7–7)
NEUTROPHILS NFR BLD AUTO: 83.3 % (ref 42.7–76)
NITRITE UR QL STRIP: POSITIVE
NRBC BLD AUTO-RTO: 0 /100 WBC (ref 0–0.2)
PH UR STRIP.AUTO: <=5 [PH] (ref 5–8)
PLATELET # BLD AUTO: 263 10*3/MM3 (ref 140–450)
PMV BLD AUTO: 11.4 FL (ref 6–12)
POTASSIUM SERPL-SCNC: 5 MMOL/L (ref 3.5–5.2)
PROCALCITONIN SERPL-MCNC: 4.2 NG/ML (ref 0–0.25)
PROT SERPL-MCNC: 7.4 G/DL (ref 6–8.5)
PROT UR QL STRIP: ABNORMAL
QT INTERVAL: 325 MS
QTC INTERVAL: 421 MS
RBC # BLD AUTO: 4.95 10*6/MM3 (ref 4.14–5.8)
RBC # UR STRIP: ABNORMAL /HPF
REF LAB TEST METHOD: ABNORMAL
SODIUM SERPL-SCNC: 134 MMOL/L (ref 136–145)
SP GR UR STRIP: 1.01 (ref 1–1.03)
SQUAMOUS #/AREA URNS HPF: ABNORMAL /HPF
TRANS CELLS #/AREA URNS HPF: ABNORMAL /HPF
TROPONIN T % DELTA: 18
TROPONIN T NUMERIC DELTA: 6 NG/L
TROPONIN T SERPL HS-MCNC: 34 NG/L
TROPONIN T SERPL HS-MCNC: 79 NG/L
UROBILINOGEN UR QL STRIP: ABNORMAL
WBC # UR STRIP: ABNORMAL /HPF
WBC NRBC COR # BLD AUTO: 3.93 10*3/MM3 (ref 3.4–10.8)
WHOLE BLOOD HOLD COAG: NORMAL
WHOLE BLOOD HOLD SPECIMEN: NORMAL

## 2025-08-24 PROCEDURE — 93010 ELECTROCARDIOGRAM REPORT: CPT | Performed by: STUDENT IN AN ORGANIZED HEALTH CARE EDUCATION/TRAINING PROGRAM

## 2025-08-24 PROCEDURE — 84484 ASSAY OF TROPONIN QUANT: CPT | Performed by: EMERGENCY MEDICINE

## 2025-08-24 PROCEDURE — 87040 BLOOD CULTURE FOR BACTERIA: CPT | Performed by: EMERGENCY MEDICINE

## 2025-08-24 PROCEDURE — 87641 MR-STAPH DNA AMP PROBE: CPT | Performed by: STUDENT IN AN ORGANIZED HEALTH CARE EDUCATION/TRAINING PROGRAM

## 2025-08-24 PROCEDURE — 87077 CULTURE AEROBIC IDENTIFY: CPT | Performed by: EMERGENCY MEDICINE

## 2025-08-24 PROCEDURE — 87186 SC STD MICRODIL/AGAR DIL: CPT | Performed by: EMERGENCY MEDICINE

## 2025-08-24 PROCEDURE — 25810000003 LACTATED RINGERS PER 1000 ML: Performed by: NURSE ANESTHETIST, CERTIFIED REGISTERED

## 2025-08-24 PROCEDURE — 25010000002 DEXAMETHASONE PER 1 MG: Performed by: NURSE ANESTHETIST, CERTIFIED REGISTERED

## 2025-08-24 PROCEDURE — 71045 X-RAY EXAM CHEST 1 VIEW: CPT

## 2025-08-24 PROCEDURE — 93005 ELECTROCARDIOGRAM TRACING: CPT | Performed by: EMERGENCY MEDICINE

## 2025-08-24 PROCEDURE — 25810000003 SODIUM CHLORIDE 0.9 % SOLUTION: Performed by: EMERGENCY MEDICINE

## 2025-08-24 PROCEDURE — 25010000002 SUGAMMADEX 200 MG/2ML SOLUTION: Performed by: NURSE ANESTHETIST, CERTIFIED REGISTERED

## 2025-08-24 PROCEDURE — 25010000002 VANCOMYCIN 5 G RECONSTITUTED SOLUTION: Performed by: EMERGENCY MEDICINE

## 2025-08-24 PROCEDURE — 94761 N-INVAS EAR/PLS OXIMETRY MLT: CPT

## 2025-08-24 PROCEDURE — C1758 CATHETER, URETERAL: HCPCS | Performed by: UROLOGY

## 2025-08-24 PROCEDURE — 25010000002 CEFTRIAXONE PER 250 MG: Performed by: STUDENT IN AN ORGANIZED HEALTH CARE EDUCATION/TRAINING PROGRAM

## 2025-08-24 PROCEDURE — 93005 ELECTROCARDIOGRAM TRACING: CPT | Performed by: STUDENT IN AN ORGANIZED HEALTH CARE EDUCATION/TRAINING PROGRAM

## 2025-08-24 PROCEDURE — 74176 CT ABD & PELVIS W/O CONTRAST: CPT

## 2025-08-24 PROCEDURE — 25010000002 CEFEPIME PER 500 MG: Performed by: EMERGENCY MEDICINE

## 2025-08-24 PROCEDURE — 25010000002 LIDOCAINE PF 2% 2 % SOLUTION: Performed by: NURSE ANESTHETIST, CERTIFIED REGISTERED

## 2025-08-24 PROCEDURE — C1769 GUIDE WIRE: HCPCS | Performed by: UROLOGY

## 2025-08-24 PROCEDURE — 25010000002 PROPOFOL 10 MG/ML EMULSION: Performed by: NURSE ANESTHETIST, CERTIFIED REGISTERED

## 2025-08-24 PROCEDURE — 80053 COMPREHEN METABOLIC PANEL: CPT | Performed by: EMERGENCY MEDICINE

## 2025-08-24 PROCEDURE — 81001 URINALYSIS AUTO W/SCOPE: CPT | Performed by: STUDENT IN AN ORGANIZED HEALTH CARE EDUCATION/TRAINING PROGRAM

## 2025-08-24 PROCEDURE — 25810000003 LACTATED RINGERS SOLUTION: Performed by: EMERGENCY MEDICINE

## 2025-08-24 PROCEDURE — 87154 CUL TYP ID BLD PTHGN 6+ TRGT: CPT | Performed by: EMERGENCY MEDICINE

## 2025-08-24 PROCEDURE — 25010000002 HYDROMORPHONE 1 MG/ML SOLUTION: Performed by: STUDENT IN AN ORGANIZED HEALTH CARE EDUCATION/TRAINING PROGRAM

## 2025-08-24 PROCEDURE — 84484 ASSAY OF TROPONIN QUANT: CPT | Performed by: STUDENT IN AN ORGANIZED HEALTH CARE EDUCATION/TRAINING PROGRAM

## 2025-08-24 PROCEDURE — 25010000002 VASOPRESSIN 20 UNIT/ML SOLUTION: Performed by: NURSE ANESTHETIST, CERTIFIED REGISTERED

## 2025-08-24 PROCEDURE — 25010000002 ONDANSETRON PER 1 MG: Performed by: NURSE ANESTHETIST, CERTIFIED REGISTERED

## 2025-08-24 PROCEDURE — 25010000002 ACETAMINOPHEN 10 MG/ML SOLUTION: Performed by: EMERGENCY MEDICINE

## 2025-08-24 PROCEDURE — 99222 1ST HOSP IP/OBS MODERATE 55: CPT | Performed by: STUDENT IN AN ORGANIZED HEALTH CARE EDUCATION/TRAINING PROGRAM

## 2025-08-24 PROCEDURE — 84145 PROCALCITONIN (PCT): CPT | Performed by: STUDENT IN AN ORGANIZED HEALTH CARE EDUCATION/TRAINING PROGRAM

## 2025-08-24 PROCEDURE — 83605 ASSAY OF LACTIC ACID: CPT | Performed by: STUDENT IN AN ORGANIZED HEALTH CARE EDUCATION/TRAINING PROGRAM

## 2025-08-24 PROCEDURE — 25810000003 LACTATED RINGERS PER 1000 ML: Performed by: STUDENT IN AN ORGANIZED HEALTH CARE EDUCATION/TRAINING PROGRAM

## 2025-08-24 PROCEDURE — 83036 HEMOGLOBIN GLYCOSYLATED A1C: CPT | Performed by: STUDENT IN AN ORGANIZED HEALTH CARE EDUCATION/TRAINING PROGRAM

## 2025-08-24 PROCEDURE — 94799 UNLISTED PULMONARY SVC/PX: CPT

## 2025-08-24 PROCEDURE — 99284 EMERGENCY DEPT VISIT MOD MDM: CPT | Performed by: UROLOGY

## 2025-08-24 PROCEDURE — 25010000002 DOXYCYCLINE 100 MG RECONSTITUTED SOLUTION 1 EACH VIAL: Performed by: STUDENT IN AN ORGANIZED HEALTH CARE EDUCATION/TRAINING PROGRAM

## 2025-08-24 PROCEDURE — 99221 1ST HOSP IP/OBS SF/LOW 40: CPT | Performed by: SURGERY

## 2025-08-24 PROCEDURE — 83735 ASSAY OF MAGNESIUM: CPT | Performed by: EMERGENCY MEDICINE

## 2025-08-24 PROCEDURE — 25010000002 HEPARIN (PORCINE) PER 1000 UNITS: Performed by: STUDENT IN AN ORGANIZED HEALTH CARE EDUCATION/TRAINING PROGRAM

## 2025-08-24 PROCEDURE — 52000 CYSTOURETHROSCOPY: CPT | Performed by: UROLOGY

## 2025-08-24 PROCEDURE — 85025 COMPLETE CBC W/AUTO DIFF WBC: CPT | Performed by: EMERGENCY MEDICINE

## 2025-08-24 RX ORDER — SODIUM CHLORIDE 9 MG/ML
40 INJECTION, SOLUTION INTRAVENOUS AS NEEDED
Status: DISCONTINUED | OUTPATIENT
Start: 2025-08-24 | End: 2025-08-29 | Stop reason: HOSPADM

## 2025-08-24 RX ORDER — HEPARIN SODIUM 5000 [USP'U]/ML
5000 INJECTION, SOLUTION INTRAVENOUS; SUBCUTANEOUS EVERY 8 HOURS SCHEDULED
Status: DISCONTINUED | OUTPATIENT
Start: 2025-08-24 | End: 2025-08-29 | Stop reason: HOSPADM

## 2025-08-24 RX ORDER — PROPOFOL 10 MG/ML
VIAL (ML) INTRAVENOUS AS NEEDED
Status: DISCONTINUED | OUTPATIENT
Start: 2025-08-24 | End: 2025-08-24 | Stop reason: SURG

## 2025-08-24 RX ORDER — BISACODYL 10 MG
20 SUPPOSITORY, RECTAL RECTAL ONCE
Status: COMPLETED | OUTPATIENT
Start: 2025-08-24 | End: 2025-08-24

## 2025-08-24 RX ORDER — ACETAMINOPHEN 10 MG/ML
1000 INJECTION, SOLUTION INTRAVENOUS ONCE
Status: COMPLETED | OUTPATIENT
Start: 2025-08-24 | End: 2025-08-24

## 2025-08-24 RX ORDER — VANCOMYCIN/0.9 % SOD CHLORIDE 1.5G/250ML
20 PLASTIC BAG, INJECTION (ML) INTRAVENOUS ONCE
Status: COMPLETED | OUTPATIENT
Start: 2025-08-24 | End: 2025-08-24

## 2025-08-24 RX ORDER — PROMETHAZINE HYDROCHLORIDE 25 MG/1
25 SUPPOSITORY RECTAL ONCE AS NEEDED
Status: DISCONTINUED | OUTPATIENT
Start: 2025-08-24 | End: 2025-08-24 | Stop reason: HOSPADM

## 2025-08-24 RX ORDER — BISACODYL 10 MG
10 SUPPOSITORY, RECTAL RECTAL ONCE
Status: COMPLETED | OUTPATIENT
Start: 2025-08-24 | End: 2025-08-24

## 2025-08-24 RX ORDER — MEMANTINE HYDROCHLORIDE 5 MG/1
5 TABLET ORAL 2 TIMES DAILY
COMMUNITY

## 2025-08-24 RX ORDER — OXYCODONE HYDROCHLORIDE 5 MG/1
5 TABLET ORAL
Status: DISCONTINUED | OUTPATIENT
Start: 2025-08-24 | End: 2025-08-24 | Stop reason: HOSPADM

## 2025-08-24 RX ORDER — VASOPRESSIN 20 [USP'U]/ML
INJECTION, SOLUTION INTRAVENOUS AS NEEDED
Status: DISCONTINUED | OUTPATIENT
Start: 2025-08-24 | End: 2025-08-24 | Stop reason: SURG

## 2025-08-24 RX ORDER — SODIUM CHLORIDE 0.9 % (FLUSH) 0.9 %
10 SYRINGE (ML) INJECTION AS NEEDED
Status: DISCONTINUED | OUTPATIENT
Start: 2025-08-24 | End: 2025-08-29 | Stop reason: HOSPADM

## 2025-08-24 RX ORDER — SODIUM CHLORIDE 0.9 % (FLUSH) 0.9 %
10 SYRINGE (ML) INJECTION EVERY 12 HOURS SCHEDULED
Status: DISCONTINUED | OUTPATIENT
Start: 2025-08-24 | End: 2025-08-29 | Stop reason: HOSPADM

## 2025-08-24 RX ORDER — ONDANSETRON 2 MG/ML
4 INJECTION INTRAMUSCULAR; INTRAVENOUS ONCE AS NEEDED
Status: DISCONTINUED | OUTPATIENT
Start: 2025-08-24 | End: 2025-08-24 | Stop reason: HOSPADM

## 2025-08-24 RX ORDER — SODIUM CHLORIDE, SODIUM LACTATE, POTASSIUM CHLORIDE, CALCIUM CHLORIDE 600; 310; 30; 20 MG/100ML; MG/100ML; MG/100ML; MG/100ML
150 INJECTION, SOLUTION INTRAVENOUS CONTINUOUS
Status: DISCONTINUED | OUTPATIENT
Start: 2025-08-24 | End: 2025-08-25

## 2025-08-24 RX ORDER — PROMETHAZINE HYDROCHLORIDE 25 MG/1
25 TABLET ORAL ONCE AS NEEDED
Status: DISCONTINUED | OUTPATIENT
Start: 2025-08-24 | End: 2025-08-24 | Stop reason: HOSPADM

## 2025-08-24 RX ORDER — SUCCINYLCHOLINE/SOD CL,ISO/PF 100 MG/5ML
SYRINGE (ML) INTRAVENOUS AS NEEDED
Status: DISCONTINUED | OUTPATIENT
Start: 2025-08-24 | End: 2025-08-24 | Stop reason: SURG

## 2025-08-24 RX ORDER — DEXAMETHASONE SODIUM PHOSPHATE 4 MG/ML
INJECTION, SOLUTION INTRA-ARTICULAR; INTRALESIONAL; INTRAMUSCULAR; INTRAVENOUS; SOFT TISSUE AS NEEDED
Status: DISCONTINUED | OUTPATIENT
Start: 2025-08-24 | End: 2025-08-24 | Stop reason: SURG

## 2025-08-24 RX ORDER — PROMETHAZINE HYDROCHLORIDE 25 MG/ML
12.5 INJECTION, SOLUTION INTRAMUSCULAR; INTRAVENOUS EVERY 6 HOURS PRN
COMMUNITY
Start: 2025-08-24

## 2025-08-24 RX ORDER — MIDAZOLAM HYDROCHLORIDE 2 MG/2ML
0.5 INJECTION, SOLUTION INTRAMUSCULAR; INTRAVENOUS
Status: DISCONTINUED | OUTPATIENT
Start: 2025-08-24 | End: 2025-08-29 | Stop reason: HOSPADM

## 2025-08-24 RX ORDER — BUPIVACAINE HCL/0.9 % NACL/PF 0.125 %
PLASTIC BAG, INJECTION (ML) EPIDURAL AS NEEDED
Status: DISCONTINUED | OUTPATIENT
Start: 2025-08-24 | End: 2025-08-24 | Stop reason: SURG

## 2025-08-24 RX ORDER — ACETAMINOPHEN 325 MG/1
650 TABLET ORAL EVERY 6 HOURS PRN
COMMUNITY

## 2025-08-24 RX ORDER — ONDANSETRON 2 MG/ML
INJECTION INTRAMUSCULAR; INTRAVENOUS AS NEEDED
Status: DISCONTINUED | OUTPATIENT
Start: 2025-08-24 | End: 2025-08-24 | Stop reason: SURG

## 2025-08-24 RX ORDER — LIDOCAINE HYDROCHLORIDE 20 MG/ML
INJECTION, SOLUTION EPIDURAL; INFILTRATION; INTRACAUDAL; PERINEURAL AS NEEDED
Status: DISCONTINUED | OUTPATIENT
Start: 2025-08-24 | End: 2025-08-24 | Stop reason: SURG

## 2025-08-24 RX ORDER — HYDROCODONE BITARTRATE AND ACETAMINOPHEN 5; 325 MG/1; MG/1
1 TABLET ORAL EVERY 6 HOURS PRN
Refills: 0 | Status: DISCONTINUED | OUTPATIENT
Start: 2025-08-24 | End: 2025-08-29 | Stop reason: HOSPADM

## 2025-08-24 RX ORDER — ROCURONIUM BROMIDE 10 MG/ML
INJECTION, SOLUTION INTRAVENOUS AS NEEDED
Status: DISCONTINUED | OUTPATIENT
Start: 2025-08-24 | End: 2025-08-24 | Stop reason: SURG

## 2025-08-24 RX ORDER — GALANTAMINE 4 MG/1
4 TABLET, FILM COATED ORAL 2 TIMES DAILY
COMMUNITY
Start: 2025-05-30

## 2025-08-24 RX ORDER — PROMETHAZINE HYDROCHLORIDE 25 MG/1
25 TABLET ORAL EVERY 6 HOURS PRN
COMMUNITY
End: 2025-08-24

## 2025-08-24 RX ORDER — SODIUM CHLORIDE, SODIUM LACTATE, POTASSIUM CHLORIDE, CALCIUM CHLORIDE 600; 310; 30; 20 MG/100ML; MG/100ML; MG/100ML; MG/100ML
INJECTION, SOLUTION INTRAVENOUS CONTINUOUS PRN
Status: DISCONTINUED | OUTPATIENT
Start: 2025-08-24 | End: 2025-08-24 | Stop reason: SURG

## 2025-08-24 RX ORDER — LOPERAMIDE HYDROCHLORIDE 2 MG/1
2 CAPSULE ORAL DAILY
COMMUNITY
End: 2025-08-24

## 2025-08-24 RX ADMIN — BISACODYL 20 MG: 10 SUPPOSITORY RECTAL at 15:13

## 2025-08-24 RX ADMIN — Medication 100 MCG: at 10:28

## 2025-08-24 RX ADMIN — VASOPRESSIN 2 UNITS: 20 INJECTION INTRAVENOUS at 11:06

## 2025-08-24 RX ADMIN — SODIUM CHLORIDE, POTASSIUM CHLORIDE, SODIUM LACTATE AND CALCIUM CHLORIDE: 600; 310; 30; 20 INJECTION, SOLUTION INTRAVENOUS at 11:17

## 2025-08-24 RX ADMIN — ROCURONIUM BROMIDE 20 MG: 10 INJECTION, SOLUTION INTRAVENOUS at 10:34

## 2025-08-24 RX ADMIN — VASOPRESSIN 2 UNITS: 20 INJECTION INTRAVENOUS at 10:56

## 2025-08-24 RX ADMIN — SODIUM CHLORIDE 1500 MG: 9 INJECTION, SOLUTION INTRAVENOUS at 07:29

## 2025-08-24 RX ADMIN — VASOPRESSIN 2 UNITS: 20 INJECTION INTRAVENOUS at 10:28

## 2025-08-24 RX ADMIN — Medication 10 ML: at 08:36

## 2025-08-24 RX ADMIN — PROPOFOL 60 MG: 10 INJECTION, EMULSION INTRAVENOUS at 10:19

## 2025-08-24 RX ADMIN — HEPARIN SODIUM 5000 UNITS: 5000 INJECTION INTRAVENOUS; SUBCUTANEOUS at 15:13

## 2025-08-24 RX ADMIN — CEFEPIME 2000 MG: 2 INJECTION, POWDER, FOR SOLUTION INTRAVENOUS at 06:49

## 2025-08-24 RX ADMIN — Medication 200 MCG: at 10:23

## 2025-08-24 RX ADMIN — BISACODYL 10 MG: 10 SUPPOSITORY RECTAL at 18:34

## 2025-08-24 RX ADMIN — CEFTRIAXONE SODIUM 1 G: 1 INJECTION, POWDER, FOR SOLUTION INTRAMUSCULAR; INTRAVENOUS at 18:34

## 2025-08-24 RX ADMIN — LIDOCAINE HYDROCHLORIDE 80 MG: 20 INJECTION, SOLUTION EPIDURAL; INFILTRATION; INTRACAUDAL; PERINEURAL at 10:19

## 2025-08-24 RX ADMIN — HYDROMORPHONE HYDROCHLORIDE 0.5 MG: 1 INJECTION, SOLUTION INTRAMUSCULAR; INTRAVENOUS; SUBCUTANEOUS at 13:09

## 2025-08-24 RX ADMIN — SUGAMMADEX 200 MG: 100 INJECTION, SOLUTION INTRAVENOUS at 11:15

## 2025-08-24 RX ADMIN — DOXYCYCLINE 100 MG: 100 INJECTION, POWDER, LYOPHILIZED, FOR SOLUTION INTRAVENOUS at 13:08

## 2025-08-24 RX ADMIN — DEXAMETHASONE SODIUM PHOSPHATE 4 MG: 4 INJECTION, SOLUTION INTRAMUSCULAR; INTRAVENOUS at 10:34

## 2025-08-24 RX ADMIN — SODIUM CHLORIDE, POTASSIUM CHLORIDE, SODIUM LACTATE AND CALCIUM CHLORIDE 150 ML/HR: 600; 310; 30; 20 INJECTION, SOLUTION INTRAVENOUS at 14:39

## 2025-08-24 RX ADMIN — ACETAMINOPHEN 1000 MG: 10 INJECTION, SOLUTION INTRAVENOUS at 06:31

## 2025-08-24 RX ADMIN — Medication 10 ML: at 21:21

## 2025-08-24 RX ADMIN — SODIUM CHLORIDE, POTASSIUM CHLORIDE, SODIUM LACTATE AND CALCIUM CHLORIDE: 600; 310; 30; 20 INJECTION, SOLUTION INTRAVENOUS at 10:11

## 2025-08-24 RX ADMIN — Medication 100 MG: at 10:19

## 2025-08-24 RX ADMIN — SODIUM CHLORIDE, POTASSIUM CHLORIDE, SODIUM LACTATE AND CALCIUM CHLORIDE 1000 ML: 600; 310; 30; 20 INJECTION, SOLUTION INTRAVENOUS at 06:32

## 2025-08-24 RX ADMIN — ONDANSETRON 4 MG: 2 INJECTION, SOLUTION INTRAMUSCULAR; INTRAVENOUS at 10:34

## 2025-08-24 RX ADMIN — Medication 100 MCG: at 10:19

## 2025-08-25 ENCOUNTER — APPOINTMENT (OUTPATIENT)
Dept: CARDIOLOGY | Facility: HOSPITAL | Age: 85
End: 2025-08-25
Payer: MEDICARE

## 2025-08-25 LAB
ANION GAP SERPL CALCULATED.3IONS-SCNC: 7.8 MMOL/L (ref 5–15)
BACTERIA BLD CULT: ABNORMAL
BH CV ECHO MEAS - AO ROOT DIAM: 2.3 CM
BH CV ECHO MEAS - EDV(CUBED): 46.7 ML
BH CV ECHO MEAS - EDV(MOD-SP4): 82.7 ML
BH CV ECHO MEAS - EF(MOD-SP4): 62.4 %
BH CV ECHO MEAS - ESV(CUBED): 13.8 ML
BH CV ECHO MEAS - ESV(MOD-SP4): 31.1 ML
BH CV ECHO MEAS - FS: 33.3 %
BH CV ECHO MEAS - IVS/LVPW: 1.08 CM
BH CV ECHO MEAS - IVSD: 1.3 CM
BH CV ECHO MEAS - LAT PEAK E' VEL: 8.8 CM/SEC
BH CV ECHO MEAS - LV DIASTOLIC VOL/BSA (35-75): 44.3 CM2
BH CV ECHO MEAS - LV MASS(C)D: 150.6 GRAMS
BH CV ECHO MEAS - LV SYSTOLIC VOL/BSA (12-30): 16.6 CM2
BH CV ECHO MEAS - LVIDD: 3.6 CM
BH CV ECHO MEAS - LVIDS: 2.4 CM
BH CV ECHO MEAS - LVOT AREA: 3.1 CM2
BH CV ECHO MEAS - LVOT DIAM: 2 CM
BH CV ECHO MEAS - LVPWD: 1.2 CM
BH CV ECHO MEAS - MED PEAK E' VEL: 6.2 CM/SEC
BH CV ECHO MEAS - MV A MAX VEL: 61.8 CM/SEC
BH CV ECHO MEAS - MV DEC SLOPE: 178 CM/SEC2
BH CV ECHO MEAS - MV DEC TIME: 0.29 SEC
BH CV ECHO MEAS - MV E MAX VEL: 50.8 CM/SEC
BH CV ECHO MEAS - MV E/A: 0.82
BH CV ECHO MEAS - SV(MOD-SP4): 51.6 ML
BH CV ECHO MEAS - SVI(MOD-SP4): 27.6 ML/M2
BH CV ECHO MEAS - TR MAX PG: 13.5 MMHG
BH CV ECHO MEAS - TR MAX VEL: 184 CM/SEC
BH CV ECHO MEASUREMENTS AVERAGE E/E' RATIO: 6.77
BOTTLE TYPE: ABNORMAL
BUN SERPL-MCNC: 37.6 MG/DL (ref 8–23)
BUN/CREAT SERPL: 34.2 (ref 7–25)
BURR CELLS BLD QL SMEAR: ABNORMAL
CALCIUM SPEC-SCNC: 8.4 MG/DL (ref 8.6–10.5)
CHLORIDE SERPL-SCNC: 105 MMOL/L (ref 98–107)
CO2 SERPL-SCNC: 24.2 MMOL/L (ref 22–29)
CREAT SERPL-MCNC: 1.1 MG/DL (ref 0.76–1.27)
D-LACTATE SERPL-SCNC: 1.7 MMOL/L (ref 0.5–2)
DEPRECATED RDW RBC AUTO: 46.5 FL (ref 37–54)
DOHLE BOD BLD QL SMEAR: PRESENT
EGFRCR SERPLBLD CKD-EPI 2021: 65.8 ML/MIN/1.73
ERYTHROCYTE [DISTWIDTH] IN BLOOD BY AUTOMATED COUNT: 13.2 % (ref 12.3–15.4)
GLUCOSE SERPL-MCNC: 104 MG/DL (ref 65–99)
HCT VFR BLD AUTO: 34.2 % (ref 37.5–51)
HGB BLD-MCNC: 11.5 G/DL (ref 13–17.7)
LYMPHOCYTES # BLD MANUAL: 0.9 10*3/MM3 (ref 0.7–3.1)
LYMPHOCYTES NFR BLD MANUAL: 4 % (ref 5–12)
MAGNESIUM SERPL-MCNC: 1.5 MG/DL (ref 1.6–2.4)
MCH RBC QN AUTO: 32.9 PG (ref 26.6–33)
MCHC RBC AUTO-ENTMCNC: 33.6 G/DL (ref 31.5–35.7)
MCV RBC AUTO: 97.7 FL (ref 79–97)
METAMYELOCYTES NFR BLD MANUAL: 4 % (ref 0–0)
MONOCYTES # BLD: 0.4 10*3/MM3 (ref 0.1–0.9)
MYELOCYTES NFR BLD MANUAL: 6 % (ref 0–0)
NEUTROPHILS # BLD AUTO: 7.68 10*3/MM3 (ref 1.7–7)
NEUTROPHILS NFR BLD MANUAL: 28 % (ref 42.7–76)
NEUTS BAND NFR BLD MANUAL: 49 % (ref 0–5)
PHOSPHATE SERPL-MCNC: 3 MG/DL (ref 2.5–4.5)
PLATELET # BLD AUTO: 161 10*3/MM3 (ref 140–450)
PMV BLD AUTO: 11.3 FL (ref 6–12)
POTASSIUM SERPL-SCNC: 4.9 MMOL/L (ref 3.5–5.2)
QT INTERVAL: 332 MS
QTC INTERVAL: 418 MS
RBC # BLD AUTO: 3.5 10*6/MM3 (ref 4.14–5.8)
SCAN SLIDE: NORMAL
SMALL PLATELETS BLD QL SMEAR: ADEQUATE
SMUDGE CELLS BLD QL SMEAR: ABNORMAL
SODIUM SERPL-SCNC: 137 MMOL/L (ref 136–145)
TOXIC GRANULATION: ABNORMAL
VARIANT LYMPHS NFR BLD MANUAL: 9 % (ref 19.6–45.3)
WBC NRBC COR # BLD AUTO: 9.98 10*3/MM3 (ref 3.4–10.8)

## 2025-08-25 PROCEDURE — 94799 UNLISTED PULMONARY SVC/PX: CPT

## 2025-08-25 PROCEDURE — 25010000002 HEPARIN (PORCINE) PER 1000 UNITS: Performed by: STUDENT IN AN ORGANIZED HEALTH CARE EDUCATION/TRAINING PROGRAM

## 2025-08-25 PROCEDURE — 92610 EVALUATE SWALLOWING FUNCTION: CPT

## 2025-08-25 PROCEDURE — 87040 BLOOD CULTURE FOR BACTERIA: CPT | Performed by: FAMILY MEDICINE

## 2025-08-25 PROCEDURE — 83735 ASSAY OF MAGNESIUM: CPT | Performed by: STUDENT IN AN ORGANIZED HEALTH CARE EDUCATION/TRAINING PROGRAM

## 2025-08-25 PROCEDURE — 99223 1ST HOSP IP/OBS HIGH 75: CPT | Performed by: INTERNAL MEDICINE

## 2025-08-25 PROCEDURE — 93306 TTE W/DOPPLER COMPLETE: CPT

## 2025-08-25 PROCEDURE — 25010000002 MAGNESIUM SULFATE 2 GM/50ML SOLUTION: Performed by: INTERNAL MEDICINE

## 2025-08-25 PROCEDURE — 97161 PT EVAL LOW COMPLEX 20 MIN: CPT

## 2025-08-25 PROCEDURE — 25010000002 CEFAZOLIN PER 500 MG: Performed by: STUDENT IN AN ORGANIZED HEALTH CARE EDUCATION/TRAINING PROGRAM

## 2025-08-25 PROCEDURE — 83605 ASSAY OF LACTIC ACID: CPT | Performed by: STUDENT IN AN ORGANIZED HEALTH CARE EDUCATION/TRAINING PROGRAM

## 2025-08-25 PROCEDURE — 85025 COMPLETE CBC W/AUTO DIFF WBC: CPT | Performed by: STUDENT IN AN ORGANIZED HEALTH CARE EDUCATION/TRAINING PROGRAM

## 2025-08-25 PROCEDURE — 80048 BASIC METABOLIC PNL TOTAL CA: CPT | Performed by: STUDENT IN AN ORGANIZED HEALTH CARE EDUCATION/TRAINING PROGRAM

## 2025-08-25 PROCEDURE — 93306 TTE W/DOPPLER COMPLETE: CPT | Performed by: STUDENT IN AN ORGANIZED HEALTH CARE EDUCATION/TRAINING PROGRAM

## 2025-08-25 PROCEDURE — 94761 N-INVAS EAR/PLS OXIMETRY MLT: CPT

## 2025-08-25 PROCEDURE — 85007 BL SMEAR W/DIFF WBC COUNT: CPT | Performed by: STUDENT IN AN ORGANIZED HEALTH CARE EDUCATION/TRAINING PROGRAM

## 2025-08-25 PROCEDURE — 25010000002 DOXYCYCLINE 100 MG RECONSTITUTED SOLUTION 1 EACH VIAL: Performed by: STUDENT IN AN ORGANIZED HEALTH CARE EDUCATION/TRAINING PROGRAM

## 2025-08-25 PROCEDURE — 84100 ASSAY OF PHOSPHORUS: CPT | Performed by: STUDENT IN AN ORGANIZED HEALTH CARE EDUCATION/TRAINING PROGRAM

## 2025-08-25 PROCEDURE — 99232 SBSQ HOSP IP/OBS MODERATE 35: CPT | Performed by: INTERNAL MEDICINE

## 2025-08-25 PROCEDURE — 25010000002 MAGNESIUM SULFATE 2 GM/50ML SOLUTION: Performed by: STUDENT IN AN ORGANIZED HEALTH CARE EDUCATION/TRAINING PROGRAM

## 2025-08-25 RX ORDER — MAGNESIUM SULFATE HEPTAHYDRATE 40 MG/ML
2 INJECTION, SOLUTION INTRAVENOUS ONCE
Status: COMPLETED | OUTPATIENT
Start: 2025-08-25 | End: 2025-08-25

## 2025-08-25 RX ADMIN — CEFAZOLIN 2000 MG: 2 INJECTION, POWDER, FOR SOLUTION INTRAMUSCULAR; INTRAVENOUS at 17:25

## 2025-08-25 RX ADMIN — DOXYCYCLINE 100 MG: 100 INJECTION, POWDER, LYOPHILIZED, FOR SOLUTION INTRAVENOUS at 00:51

## 2025-08-25 RX ADMIN — MAGNESIUM SULFATE HEPTAHYDRATE 2 G: 40 INJECTION, SOLUTION INTRAVENOUS at 12:53

## 2025-08-25 RX ADMIN — HEPARIN SODIUM 5000 UNITS: 5000 INJECTION INTRAVENOUS; SUBCUTANEOUS at 14:07

## 2025-08-25 RX ADMIN — HEPARIN SODIUM 5000 UNITS: 5000 INJECTION INTRAVENOUS; SUBCUTANEOUS at 22:31

## 2025-08-25 RX ADMIN — MAGNESIUM SULFATE HEPTAHYDRATE 2 G: 40 INJECTION, SOLUTION INTRAVENOUS at 11:02

## 2025-08-25 RX ADMIN — Medication 10 ML: at 22:32

## 2025-08-25 RX ADMIN — CEFAZOLIN 2000 MG: 2 INJECTION, POWDER, FOR SOLUTION INTRAMUSCULAR; INTRAVENOUS at 09:57

## 2025-08-25 RX ADMIN — DOXYCYCLINE 100 MG: 100 INJECTION, POWDER, LYOPHILIZED, FOR SOLUTION INTRAVENOUS at 12:54

## 2025-08-25 RX ADMIN — Medication 10 ML: at 09:56

## 2025-08-26 LAB
ANION GAP SERPL CALCULATED.3IONS-SCNC: 9 MMOL/L (ref 5–15)
BASOPHILS # BLD AUTO: 0.06 10*3/MM3 (ref 0–0.2)
BASOPHILS NFR BLD AUTO: 0.6 % (ref 0–1.5)
BUN SERPL-MCNC: 33 MG/DL (ref 8–23)
BUN/CREAT SERPL: 31.4 (ref 7–25)
CALCIUM SPEC-SCNC: 8.6 MG/DL (ref 8.6–10.5)
CHLORIDE SERPL-SCNC: 105 MMOL/L (ref 98–107)
CO2 SERPL-SCNC: 25 MMOL/L (ref 22–29)
CREAT SERPL-MCNC: 1.05 MG/DL (ref 0.76–1.27)
DEPRECATED RDW RBC AUTO: 48.7 FL (ref 37–54)
EGFRCR SERPLBLD CKD-EPI 2021: 69.6 ML/MIN/1.73
EOSINOPHIL # BLD AUTO: 0 10*3/MM3 (ref 0–0.4)
EOSINOPHIL NFR BLD AUTO: 0 % (ref 0.3–6.2)
ERYTHROCYTE [DISTWIDTH] IN BLOOD BY AUTOMATED COUNT: 13.4 % (ref 12.3–15.4)
GLUCOSE SERPL-MCNC: 90 MG/DL (ref 65–99)
HCT VFR BLD AUTO: 34.5 % (ref 37.5–51)
HGB BLD-MCNC: 11.4 G/DL (ref 13–17.7)
IMM GRANULOCYTES # BLD AUTO: 0.07 10*3/MM3 (ref 0–0.05)
IMM GRANULOCYTES NFR BLD AUTO: 0.7 % (ref 0–0.5)
LYMPHOCYTES # BLD AUTO: 0.64 10*3/MM3 (ref 0.7–3.1)
LYMPHOCYTES NFR BLD AUTO: 6.3 % (ref 19.6–45.3)
MAGNESIUM SERPL-MCNC: 2.3 MG/DL (ref 1.6–2.4)
MCH RBC QN AUTO: 32.5 PG (ref 26.6–33)
MCHC RBC AUTO-ENTMCNC: 33 G/DL (ref 31.5–35.7)
MCV RBC AUTO: 98.3 FL (ref 79–97)
MONOCYTES # BLD AUTO: 0.48 10*3/MM3 (ref 0.1–0.9)
MONOCYTES NFR BLD AUTO: 4.7 % (ref 5–12)
NEUTROPHILS NFR BLD AUTO: 8.96 10*3/MM3 (ref 1.7–7)
NEUTROPHILS NFR BLD AUTO: 87.7 % (ref 42.7–76)
NRBC BLD AUTO-RTO: 0 /100 WBC (ref 0–0.2)
PHOSPHATE SERPL-MCNC: 2.6 MG/DL (ref 2.5–4.5)
PLATELET # BLD AUTO: 144 10*3/MM3 (ref 140–450)
PMV BLD AUTO: 11.3 FL (ref 6–12)
POTASSIUM SERPL-SCNC: 4.1 MMOL/L (ref 3.5–5.2)
RBC # BLD AUTO: 3.51 10*6/MM3 (ref 4.14–5.8)
SODIUM SERPL-SCNC: 139 MMOL/L (ref 136–145)
WBC NRBC COR # BLD AUTO: 10.21 10*3/MM3 (ref 3.4–10.8)

## 2025-08-26 PROCEDURE — 94761 N-INVAS EAR/PLS OXIMETRY MLT: CPT

## 2025-08-26 PROCEDURE — 85025 COMPLETE CBC W/AUTO DIFF WBC: CPT | Performed by: STUDENT IN AN ORGANIZED HEALTH CARE EDUCATION/TRAINING PROGRAM

## 2025-08-26 PROCEDURE — 25010000002 HEPARIN (PORCINE) PER 1000 UNITS: Performed by: STUDENT IN AN ORGANIZED HEALTH CARE EDUCATION/TRAINING PROGRAM

## 2025-08-26 PROCEDURE — 83735 ASSAY OF MAGNESIUM: CPT | Performed by: STUDENT IN AN ORGANIZED HEALTH CARE EDUCATION/TRAINING PROGRAM

## 2025-08-26 PROCEDURE — 99232 SBSQ HOSP IP/OBS MODERATE 35: CPT | Performed by: INTERNAL MEDICINE

## 2025-08-26 PROCEDURE — 25010000002 DOXYCYCLINE 100 MG RECONSTITUTED SOLUTION 1 EACH VIAL: Performed by: STUDENT IN AN ORGANIZED HEALTH CARE EDUCATION/TRAINING PROGRAM

## 2025-08-26 PROCEDURE — 94799 UNLISTED PULMONARY SVC/PX: CPT

## 2025-08-26 PROCEDURE — 25010000002 CEFAZOLIN PER 500 MG: Performed by: STUDENT IN AN ORGANIZED HEALTH CARE EDUCATION/TRAINING PROGRAM

## 2025-08-26 PROCEDURE — 80048 BASIC METABOLIC PNL TOTAL CA: CPT | Performed by: STUDENT IN AN ORGANIZED HEALTH CARE EDUCATION/TRAINING PROGRAM

## 2025-08-26 PROCEDURE — 84100 ASSAY OF PHOSPHORUS: CPT | Performed by: STUDENT IN AN ORGANIZED HEALTH CARE EDUCATION/TRAINING PROGRAM

## 2025-08-26 RX ADMIN — CEFAZOLIN 2000 MG: 2 INJECTION, POWDER, FOR SOLUTION INTRAMUSCULAR; INTRAVENOUS at 18:32

## 2025-08-26 RX ADMIN — HEPARIN SODIUM 5000 UNITS: 5000 INJECTION INTRAVENOUS; SUBCUTANEOUS at 06:09

## 2025-08-26 RX ADMIN — Medication 10 ML: at 21:38

## 2025-08-26 RX ADMIN — DOXYCYCLINE 100 MG: 100 INJECTION, POWDER, LYOPHILIZED, FOR SOLUTION INTRAVENOUS at 14:42

## 2025-08-26 RX ADMIN — Medication 10 ML: at 09:48

## 2025-08-26 RX ADMIN — CEFAZOLIN 2000 MG: 2 INJECTION, POWDER, FOR SOLUTION INTRAMUSCULAR; INTRAVENOUS at 02:05

## 2025-08-26 RX ADMIN — DOXYCYCLINE 100 MG: 100 INJECTION, POWDER, LYOPHILIZED, FOR SOLUTION INTRAVENOUS at 00:47

## 2025-08-26 RX ADMIN — CEFAZOLIN 2000 MG: 2 INJECTION, POWDER, FOR SOLUTION INTRAMUSCULAR; INTRAVENOUS at 09:48

## 2025-08-26 RX ADMIN — HEPARIN SODIUM 5000 UNITS: 5000 INJECTION INTRAVENOUS; SUBCUTANEOUS at 14:42

## 2025-08-26 RX ADMIN — HEPARIN SODIUM 5000 UNITS: 5000 INJECTION INTRAVENOUS; SUBCUTANEOUS at 21:38

## 2025-08-27 LAB
ANION GAP SERPL CALCULATED.3IONS-SCNC: 7.4 MMOL/L (ref 5–15)
BASOPHILS # BLD AUTO: 0.07 10*3/MM3 (ref 0–0.2)
BASOPHILS NFR BLD AUTO: 0.7 % (ref 0–1.5)
BUN SERPL-MCNC: 32.6 MG/DL (ref 8–23)
BUN/CREAT SERPL: 36.2 (ref 7–25)
CALCIUM SPEC-SCNC: 8.5 MG/DL (ref 8.6–10.5)
CHLORIDE SERPL-SCNC: 108 MMOL/L (ref 98–107)
CO2 SERPL-SCNC: 25.6 MMOL/L (ref 22–29)
CREAT SERPL-MCNC: 0.9 MG/DL (ref 0.76–1.27)
DEPRECATED RDW RBC AUTO: 50.1 FL (ref 37–54)
EGFRCR SERPLBLD CKD-EPI 2021: 83.7 ML/MIN/1.73
EOSINOPHIL # BLD AUTO: 0.02 10*3/MM3 (ref 0–0.4)
EOSINOPHIL NFR BLD AUTO: 0.2 % (ref 0.3–6.2)
ERYTHROCYTE [DISTWIDTH] IN BLOOD BY AUTOMATED COUNT: 13.5 % (ref 12.3–15.4)
GLUCOSE SERPL-MCNC: 94 MG/DL (ref 65–99)
HCT VFR BLD AUTO: 32.7 % (ref 37.5–51)
HGB BLD-MCNC: 10.6 G/DL (ref 13–17.7)
IMM GRANULOCYTES # BLD AUTO: 0.07 10*3/MM3 (ref 0–0.05)
IMM GRANULOCYTES NFR BLD AUTO: 0.7 % (ref 0–0.5)
LYMPHOCYTES # BLD AUTO: 0.89 10*3/MM3 (ref 0.7–3.1)
LYMPHOCYTES NFR BLD AUTO: 8.3 % (ref 19.6–45.3)
MAGNESIUM SERPL-MCNC: 2 MG/DL (ref 1.6–2.4)
MCH RBC QN AUTO: 32.8 PG (ref 26.6–33)
MCHC RBC AUTO-ENTMCNC: 32.4 G/DL (ref 31.5–35.7)
MCV RBC AUTO: 101.2 FL (ref 79–97)
MONOCYTES # BLD AUTO: 0.46 10*3/MM3 (ref 0.1–0.9)
MONOCYTES NFR BLD AUTO: 4.3 % (ref 5–12)
NEUTROPHILS NFR BLD AUTO: 85.8 % (ref 42.7–76)
NEUTROPHILS NFR BLD AUTO: 9.23 10*3/MM3 (ref 1.7–7)
NRBC BLD AUTO-RTO: 0 /100 WBC (ref 0–0.2)
PHOSPHATE SERPL-MCNC: 1.5 MG/DL (ref 2.5–4.5)
PLATELET # BLD AUTO: 153 10*3/MM3 (ref 140–450)
PMV BLD AUTO: 11.5 FL (ref 6–12)
POTASSIUM SERPL-SCNC: 3.6 MMOL/L (ref 3.5–5.2)
RBC # BLD AUTO: 3.23 10*6/MM3 (ref 4.14–5.8)
SODIUM SERPL-SCNC: 141 MMOL/L (ref 136–145)
WBC NRBC COR # BLD AUTO: 10.74 10*3/MM3 (ref 3.4–10.8)

## 2025-08-27 PROCEDURE — 25010000002 DOXYCYCLINE 100 MG RECONSTITUTED SOLUTION 1 EACH VIAL: Performed by: STUDENT IN AN ORGANIZED HEALTH CARE EDUCATION/TRAINING PROGRAM

## 2025-08-27 PROCEDURE — 25810000003 SODIUM CHLORIDE 0.9 % SOLUTION: Performed by: FAMILY MEDICINE

## 2025-08-27 PROCEDURE — 83735 ASSAY OF MAGNESIUM: CPT | Performed by: STUDENT IN AN ORGANIZED HEALTH CARE EDUCATION/TRAINING PROGRAM

## 2025-08-27 PROCEDURE — 99232 SBSQ HOSP IP/OBS MODERATE 35: CPT | Performed by: INTERNAL MEDICINE

## 2025-08-27 PROCEDURE — 84100 ASSAY OF PHOSPHORUS: CPT | Performed by: STUDENT IN AN ORGANIZED HEALTH CARE EDUCATION/TRAINING PROGRAM

## 2025-08-27 PROCEDURE — 80048 BASIC METABOLIC PNL TOTAL CA: CPT | Performed by: STUDENT IN AN ORGANIZED HEALTH CARE EDUCATION/TRAINING PROGRAM

## 2025-08-27 PROCEDURE — 25010000002 HEPARIN (PORCINE) PER 1000 UNITS: Performed by: STUDENT IN AN ORGANIZED HEALTH CARE EDUCATION/TRAINING PROGRAM

## 2025-08-27 PROCEDURE — 85025 COMPLETE CBC W/AUTO DIFF WBC: CPT | Performed by: STUDENT IN AN ORGANIZED HEALTH CARE EDUCATION/TRAINING PROGRAM

## 2025-08-27 PROCEDURE — 25010000002 CEFAZOLIN PER 500 MG: Performed by: STUDENT IN AN ORGANIZED HEALTH CARE EDUCATION/TRAINING PROGRAM

## 2025-08-27 RX ORDER — FENTANYL/ROPIVACAINE/NS/PF 2-625MCG/1
15 PLASTIC BAG, INJECTION (ML) EPIDURAL ONCE
Status: COMPLETED | OUTPATIENT
Start: 2025-08-27 | End: 2025-08-27

## 2025-08-27 RX ADMIN — CEFAZOLIN 2000 MG: 2 INJECTION, POWDER, FOR SOLUTION INTRAMUSCULAR; INTRAVENOUS at 20:41

## 2025-08-27 RX ADMIN — CEFAZOLIN 2000 MG: 2 INJECTION, POWDER, FOR SOLUTION INTRAMUSCULAR; INTRAVENOUS at 10:22

## 2025-08-27 RX ADMIN — DOXYCYCLINE 100 MG: 100 INJECTION, POWDER, LYOPHILIZED, FOR SOLUTION INTRAVENOUS at 13:02

## 2025-08-27 RX ADMIN — Medication 10 ML: at 20:41

## 2025-08-27 RX ADMIN — POTASSIUM PHOSPHATE 15 MMOL: 236; 224 INJECTION, SOLUTION INTRAVENOUS at 09:14

## 2025-08-27 RX ADMIN — CEFAZOLIN 2000 MG: 2 INJECTION, POWDER, FOR SOLUTION INTRAMUSCULAR; INTRAVENOUS at 02:08

## 2025-08-27 RX ADMIN — DOXYCYCLINE 100 MG: 100 INJECTION, POWDER, LYOPHILIZED, FOR SOLUTION INTRAVENOUS at 01:02

## 2025-08-27 RX ADMIN — HYDROCODONE BITARTRATE AND ACETAMINOPHEN 1 TABLET: 5; 325 TABLET ORAL at 13:02

## 2025-08-27 RX ADMIN — Medication 10 ML: at 09:14

## 2025-08-27 RX ADMIN — HEPARIN SODIUM 5000 UNITS: 5000 INJECTION INTRAVENOUS; SUBCUTANEOUS at 05:24

## 2025-08-28 ENCOUNTER — APPOINTMENT (OUTPATIENT)
Dept: CT IMAGING | Facility: HOSPITAL | Age: 85
End: 2025-08-28
Payer: MEDICARE

## 2025-08-28 LAB
ANION GAP SERPL CALCULATED.3IONS-SCNC: 9.1 MMOL/L (ref 5–15)
BACTERIA SPEC AEROBE CULT: ABNORMAL
BASOPHILS # BLD AUTO: 0.04 10*3/MM3 (ref 0–0.2)
BASOPHILS NFR BLD AUTO: 0.5 % (ref 0–1.5)
BUN SERPL-MCNC: 28.7 MG/DL (ref 8–23)
BUN/CREAT SERPL: 35.4 (ref 7–25)
CALCIUM SPEC-SCNC: 8.4 MG/DL (ref 8.6–10.5)
CHLORIDE SERPL-SCNC: 109 MMOL/L (ref 98–107)
CO2 SERPL-SCNC: 24.9 MMOL/L (ref 22–29)
CREAT SERPL-MCNC: 0.81 MG/DL (ref 0.76–1.27)
DEPRECATED RDW RBC AUTO: 50 FL (ref 37–54)
EGFRCR SERPLBLD CKD-EPI 2021: 86.4 ML/MIN/1.73
EOSINOPHIL # BLD AUTO: 0.03 10*3/MM3 (ref 0–0.4)
EOSINOPHIL NFR BLD AUTO: 0.4 % (ref 0.3–6.2)
ERYTHROCYTE [DISTWIDTH] IN BLOOD BY AUTOMATED COUNT: 13.4 % (ref 12.3–15.4)
GLUCOSE SERPL-MCNC: 91 MG/DL (ref 65–99)
GRAM STN SPEC: ABNORMAL
HCT VFR BLD AUTO: 31.8 % (ref 37.5–51)
HGB BLD-MCNC: 10.3 G/DL (ref 13–17.7)
IMM GRANULOCYTES # BLD AUTO: 0.25 10*3/MM3 (ref 0–0.05)
IMM GRANULOCYTES NFR BLD AUTO: 3 % (ref 0–0.5)
ISOLATED FROM: ABNORMAL
LYMPHOCYTES # BLD AUTO: 1.25 10*3/MM3 (ref 0.7–3.1)
LYMPHOCYTES NFR BLD AUTO: 15 % (ref 19.6–45.3)
MAGNESIUM SERPL-MCNC: 1.8 MG/DL (ref 1.6–2.4)
MCH RBC QN AUTO: 32.5 PG (ref 26.6–33)
MCHC RBC AUTO-ENTMCNC: 32.4 G/DL (ref 31.5–35.7)
MCV RBC AUTO: 100.3 FL (ref 79–97)
MONOCYTES # BLD AUTO: 0.56 10*3/MM3 (ref 0.1–0.9)
MONOCYTES NFR BLD AUTO: 6.7 % (ref 5–12)
NEUTROPHILS NFR BLD AUTO: 6.19 10*3/MM3 (ref 1.7–7)
NEUTROPHILS NFR BLD AUTO: 74.4 % (ref 42.7–76)
NRBC BLD AUTO-RTO: 0 /100 WBC (ref 0–0.2)
PHOSPHATE SERPL-MCNC: 1.9 MG/DL (ref 2.5–4.5)
PLATELET # BLD AUTO: 164 10*3/MM3 (ref 140–450)
PMV BLD AUTO: 11.5 FL (ref 6–12)
POTASSIUM SERPL-SCNC: 3.4 MMOL/L (ref 3.5–5.2)
RBC # BLD AUTO: 3.17 10*6/MM3 (ref 4.14–5.8)
SODIUM SERPL-SCNC: 143 MMOL/L (ref 136–145)
WBC NRBC COR # BLD AUTO: 8.32 10*3/MM3 (ref 3.4–10.8)

## 2025-08-28 PROCEDURE — 25010000002 DOXYCYCLINE 100 MG RECONSTITUTED SOLUTION 1 EACH VIAL: Performed by: STUDENT IN AN ORGANIZED HEALTH CARE EDUCATION/TRAINING PROGRAM

## 2025-08-28 PROCEDURE — 80048 BASIC METABOLIC PNL TOTAL CA: CPT | Performed by: STUDENT IN AN ORGANIZED HEALTH CARE EDUCATION/TRAINING PROGRAM

## 2025-08-28 PROCEDURE — 25010000002 HEPARIN (PORCINE) PER 1000 UNITS: Performed by: STUDENT IN AN ORGANIZED HEALTH CARE EDUCATION/TRAINING PROGRAM

## 2025-08-28 PROCEDURE — 84100 ASSAY OF PHOSPHORUS: CPT | Performed by: STUDENT IN AN ORGANIZED HEALTH CARE EDUCATION/TRAINING PROGRAM

## 2025-08-28 PROCEDURE — 92526 ORAL FUNCTION THERAPY: CPT

## 2025-08-28 PROCEDURE — 25810000003 SODIUM CHLORIDE 0.9 % SOLUTION: Performed by: INTERNAL MEDICINE

## 2025-08-28 PROCEDURE — 83735 ASSAY OF MAGNESIUM: CPT | Performed by: STUDENT IN AN ORGANIZED HEALTH CARE EDUCATION/TRAINING PROGRAM

## 2025-08-28 PROCEDURE — 85025 COMPLETE CBC W/AUTO DIFF WBC: CPT | Performed by: STUDENT IN AN ORGANIZED HEALTH CARE EDUCATION/TRAINING PROGRAM

## 2025-08-28 PROCEDURE — 25010000002 HYALURONIDASE (HUMAN) 150 UNIT/ML SOLUTION 1 ML VIAL: Performed by: INTERNAL MEDICINE

## 2025-08-28 PROCEDURE — 99232 SBSQ HOSP IP/OBS MODERATE 35: CPT | Performed by: INTERNAL MEDICINE

## 2025-08-28 PROCEDURE — 25010000002 CEFAZOLIN PER 500 MG: Performed by: STUDENT IN AN ORGANIZED HEALTH CARE EDUCATION/TRAINING PROGRAM

## 2025-08-28 PROCEDURE — 94799 UNLISTED PULMONARY SVC/PX: CPT

## 2025-08-28 RX ORDER — FENTANYL/ROPIVACAINE/NS/PF 2-625MCG/1
15 PLASTIC BAG, INJECTION (ML) EPIDURAL ONCE
Status: COMPLETED | OUTPATIENT
Start: 2025-08-28 | End: 2025-08-28

## 2025-08-28 RX ORDER — DOCUSATE SODIUM 100 MG/1
100 CAPSULE, LIQUID FILLED ORAL 2 TIMES DAILY
Status: DISCONTINUED | OUTPATIENT
Start: 2025-08-28 | End: 2025-08-29 | Stop reason: HOSPADM

## 2025-08-28 RX ORDER — POLYETHYLENE GLYCOL 3350 17 G/17G
17 POWDER, FOR SOLUTION ORAL DAILY
Status: DISCONTINUED | OUTPATIENT
Start: 2025-08-29 | End: 2025-08-28

## 2025-08-28 RX ORDER — POTASSIUM CHLORIDE 1500 MG/1
20 TABLET, EXTENDED RELEASE ORAL ONCE
Status: COMPLETED | OUTPATIENT
Start: 2025-08-28 | End: 2025-08-28

## 2025-08-28 RX ADMIN — DOXYCYCLINE 100 MG: 100 INJECTION, POWDER, LYOPHILIZED, FOR SOLUTION INTRAVENOUS at 02:22

## 2025-08-28 RX ADMIN — HEPARIN SODIUM 5000 UNITS: 5000 INJECTION INTRAVENOUS; SUBCUTANEOUS at 14:12

## 2025-08-28 RX ADMIN — Medication 10 ML: at 21:49

## 2025-08-28 RX ADMIN — Medication 10 ML: at 09:11

## 2025-08-28 RX ADMIN — CEFAZOLIN 2000 MG: 2 INJECTION, POWDER, FOR SOLUTION INTRAMUSCULAR; INTRAVENOUS at 14:12

## 2025-08-28 RX ADMIN — CEFAZOLIN 2000 MG: 2 INJECTION, POWDER, FOR SOLUTION INTRAMUSCULAR; INTRAVENOUS at 21:49

## 2025-08-28 RX ADMIN — POTASSIUM CHLORIDE 20 MEQ: 1500 TABLET, EXTENDED RELEASE ORAL at 11:58

## 2025-08-28 RX ADMIN — HYALURONIDASE (HUMAN RECOMBINANT) 150 UNITS: 150 INJECTION, SOLUTION SUBCUTANEOUS at 22:11

## 2025-08-28 RX ADMIN — POTASSIUM PHOSPHATE 15 MMOL: 236; 224 INJECTION, SOLUTION INTRAVENOUS at 09:11

## 2025-08-28 RX ADMIN — DOXYCYCLINE 100 MG: 100 INJECTION, POWDER, LYOPHILIZED, FOR SOLUTION INTRAVENOUS at 12:38

## 2025-08-28 RX ADMIN — CEFAZOLIN 2000 MG: 2 INJECTION, POWDER, FOR SOLUTION INTRAMUSCULAR; INTRAVENOUS at 03:32

## 2025-08-29 VITALS
HEART RATE: 66 BPM | BODY MASS INDEX: 21.57 KG/M2 | WEIGHT: 154.1 LBS | TEMPERATURE: 97.9 F | OXYGEN SATURATION: 95 % | DIASTOLIC BLOOD PRESSURE: 82 MMHG | RESPIRATION RATE: 17 BRPM | SYSTOLIC BLOOD PRESSURE: 142 MMHG | HEIGHT: 71 IN

## 2025-08-29 PROBLEM — J15.69 PNEUMONIA DUE TO OTHER GRAM-NEGATIVE BACTERIA: Status: ACTIVE | Noted: 2025-08-29

## 2025-08-29 LAB
ANION GAP SERPL CALCULATED.3IONS-SCNC: 8.7 MMOL/L (ref 5–15)
BUN SERPL-MCNC: 22.5 MG/DL (ref 8–23)
BUN/CREAT SERPL: 28.8 (ref 7–25)
CALCIUM SPEC-SCNC: 8.4 MG/DL (ref 8.6–10.5)
CHLORIDE SERPL-SCNC: 110 MMOL/L (ref 98–107)
CO2 SERPL-SCNC: 26.3 MMOL/L (ref 22–29)
CREAT SERPL-MCNC: 0.78 MG/DL (ref 0.76–1.27)
DEPRECATED RDW RBC AUTO: 49.2 FL (ref 37–54)
EGFRCR SERPLBLD CKD-EPI 2021: 87.4 ML/MIN/1.73
EOSINOPHIL # BLD MANUAL: 0.09 10*3/MM3 (ref 0–0.4)
EOSINOPHIL NFR BLD MANUAL: 1 % (ref 0.3–6.2)
ERYTHROCYTE [DISTWIDTH] IN BLOOD BY AUTOMATED COUNT: 13.2 % (ref 12.3–15.4)
GLUCOSE SERPL-MCNC: 79 MG/DL (ref 65–99)
HCT VFR BLD AUTO: 33.9 % (ref 37.5–51)
HGB BLD-MCNC: 10.9 G/DL (ref 13–17.7)
LYMPHOCYTES # BLD MANUAL: 1.35 10*3/MM3 (ref 0.7–3.1)
LYMPHOCYTES NFR BLD MANUAL: 5 % (ref 5–12)
MAGNESIUM SERPL-MCNC: 1.8 MG/DL (ref 1.6–2.4)
MCH RBC QN AUTO: 32.2 PG (ref 26.6–33)
MCHC RBC AUTO-ENTMCNC: 32.2 G/DL (ref 31.5–35.7)
MCV RBC AUTO: 100.3 FL (ref 79–97)
METAMYELOCYTES NFR BLD MANUAL: 2 % (ref 0–0)
MONOCYTES # BLD: 0.45 10*3/MM3 (ref 0.1–0.9)
NEUTROPHILS # BLD AUTO: 6.94 10*3/MM3 (ref 1.7–7)
NEUTROPHILS NFR BLD MANUAL: 77 % (ref 42.7–76)
PHOSPHATE SERPL-MCNC: 2.4 MG/DL (ref 2.5–4.5)
PLAT MORPH BLD: NORMAL
PLATELET # BLD AUTO: 187 10*3/MM3 (ref 140–450)
PMV BLD AUTO: 11.3 FL (ref 6–12)
POTASSIUM SERPL-SCNC: 3.8 MMOL/L (ref 3.5–5.2)
RBC # BLD AUTO: 3.38 10*6/MM3 (ref 4.14–5.8)
RBC MORPH BLD: NORMAL
SCAN SLIDE: NORMAL
SODIUM SERPL-SCNC: 145 MMOL/L (ref 136–145)
VARIANT LYMPHS NFR BLD MANUAL: 15 % (ref 19.6–45.3)
WBC MORPH BLD: NORMAL
WBC NRBC COR # BLD AUTO: 9.01 10*3/MM3 (ref 3.4–10.8)

## 2025-08-29 PROCEDURE — 83735 ASSAY OF MAGNESIUM: CPT | Performed by: STUDENT IN AN ORGANIZED HEALTH CARE EDUCATION/TRAINING PROGRAM

## 2025-08-29 PROCEDURE — 94799 UNLISTED PULMONARY SVC/PX: CPT

## 2025-08-29 PROCEDURE — 84100 ASSAY OF PHOSPHORUS: CPT | Performed by: STUDENT IN AN ORGANIZED HEALTH CARE EDUCATION/TRAINING PROGRAM

## 2025-08-29 PROCEDURE — 85007 BL SMEAR W/DIFF WBC COUNT: CPT | Performed by: STUDENT IN AN ORGANIZED HEALTH CARE EDUCATION/TRAINING PROGRAM

## 2025-08-29 PROCEDURE — 25010000002 DOXYCYCLINE 100 MG RECONSTITUTED SOLUTION 1 EACH VIAL: Performed by: STUDENT IN AN ORGANIZED HEALTH CARE EDUCATION/TRAINING PROGRAM

## 2025-08-29 PROCEDURE — 80048 BASIC METABOLIC PNL TOTAL CA: CPT | Performed by: STUDENT IN AN ORGANIZED HEALTH CARE EDUCATION/TRAINING PROGRAM

## 2025-08-29 PROCEDURE — 25010000002 CEFAZOLIN PER 500 MG: Performed by: STUDENT IN AN ORGANIZED HEALTH CARE EDUCATION/TRAINING PROGRAM

## 2025-08-29 PROCEDURE — 25010000002 HEPARIN (PORCINE) PER 1000 UNITS: Performed by: STUDENT IN AN ORGANIZED HEALTH CARE EDUCATION/TRAINING PROGRAM

## 2025-08-29 PROCEDURE — 85025 COMPLETE CBC W/AUTO DIFF WBC: CPT | Performed by: STUDENT IN AN ORGANIZED HEALTH CARE EDUCATION/TRAINING PROGRAM

## 2025-08-29 RX ORDER — PSEUDOEPHEDRINE HCL 30 MG
100 TABLET ORAL 2 TIMES DAILY
Start: 2025-08-29

## 2025-08-29 RX ORDER — CEFUROXIME AXETIL 500 MG/1
500 TABLET ORAL 2 TIMES DAILY
Qty: 8 TABLET | Refills: 0 | Status: SHIPPED | OUTPATIENT
Start: 2025-08-29 | End: 2025-09-02

## 2025-08-29 RX ADMIN — Medication 10 ML: at 09:03

## 2025-08-29 RX ADMIN — MAGNESIUM HYDROXIDE 10 ML: 2400 SUSPENSION ORAL at 09:01

## 2025-08-29 RX ADMIN — CEFAZOLIN 2000 MG: 2 INJECTION, POWDER, FOR SOLUTION INTRAMUSCULAR; INTRAVENOUS at 05:10

## 2025-08-29 RX ADMIN — DOXYCYCLINE 100 MG: 100 INJECTION, POWDER, LYOPHILIZED, FOR SOLUTION INTRAVENOUS at 00:15

## 2025-08-29 RX ADMIN — CEFAZOLIN 2000 MG: 2 INJECTION, POWDER, FOR SOLUTION INTRAMUSCULAR; INTRAVENOUS at 13:00

## 2025-08-29 RX ADMIN — HEPARIN SODIUM 5000 UNITS: 5000 INJECTION INTRAVENOUS; SUBCUTANEOUS at 13:00

## 2025-08-30 LAB
BACTERIA SPEC AEROBE CULT: NORMAL
BACTERIA SPEC AEROBE CULT: NORMAL

## (undated) DEVICE — Device

## (undated) DEVICE — CYSTO PACK: Brand: MEDLINE INDUSTRIES, INC.

## (undated) DEVICE — SKIN PREP TRAY W/CHG: Brand: MEDLINE INDUSTRIES, INC.

## (undated) DEVICE — GLOVE,SURG,SENSICARE SLT,LF,PF,7.5: Brand: MEDLINE

## (undated) DEVICE — NITINOL WIRE WITH HYDROPHILIC TIP: Brand: SENSOR

## (undated) DEVICE — CATH URETRL OPEN END W/CONNECT 5F 70CM

## (undated) DEVICE — SOL IRRG H2O BG 3000ML STRL

## (undated) DEVICE — TOWEL,OR,DSP,ST,BLUE,STD,4/PK,20PK/CS: Brand: MEDLINE

## (undated) DEVICE — CYSTO/BLADDER IRRIGATION SET, REGULATING CLAMP

## (undated) DEVICE — THE STERILE LIGHT HANDLE COVER IS USED WITH STERIS SURGICAL LIGHTING AND VISUALIZATION SYSTEMS.

## (undated) DEVICE — TRY UROL STRICTURE